# Patient Record
Sex: FEMALE | Race: WHITE | NOT HISPANIC OR LATINO | Employment: OTHER | URBAN - METROPOLITAN AREA
[De-identification: names, ages, dates, MRNs, and addresses within clinical notes are randomized per-mention and may not be internally consistent; named-entity substitution may affect disease eponyms.]

---

## 2017-06-16 ENCOUNTER — HISTORICAL (OUTPATIENT)
Dept: RADIOLOGY | Facility: HOSPITAL | Age: 67
End: 2017-06-16

## 2017-10-23 ENCOUNTER — HISTORICAL (OUTPATIENT)
Dept: ADMINISTRATIVE | Facility: HOSPITAL | Age: 67
End: 2017-10-23

## 2021-09-20 PROBLEM — E78.5 HYPERLIPIDEMIA: Status: ACTIVE | Noted: 2020-09-08

## 2021-09-20 PROBLEM — N18.30 STAGE 3 CHRONIC KIDNEY DISEASE: Status: ACTIVE | Noted: 2020-09-08

## 2021-09-20 PROBLEM — I63.9 CEREBROVASCULAR ACCIDENT: Status: ACTIVE | Noted: 2020-03-10

## 2021-09-20 PROBLEM — R73.03 PREDIABETES: Status: ACTIVE | Noted: 2020-03-10

## 2021-09-20 PROBLEM — I25.10 CORONARY ARTERIOSCLEROSIS: Status: ACTIVE | Noted: 2020-03-10

## 2021-09-20 PROBLEM — Z86.73 HISTORY OF CEREBROVASCULAR ACCIDENT: Status: ACTIVE | Noted: 2020-09-08

## 2022-04-09 ENCOUNTER — HISTORICAL (OUTPATIENT)
Dept: ADMINISTRATIVE | Facility: HOSPITAL | Age: 72
End: 2022-04-09

## 2022-04-26 VITALS
BODY MASS INDEX: 22.43 KG/M2 | HEIGHT: 67 IN | DIASTOLIC BLOOD PRESSURE: 80 MMHG | SYSTOLIC BLOOD PRESSURE: 128 MMHG | WEIGHT: 142.88 LBS

## 2022-06-07 DIAGNOSIS — N18.30 STAGE 3 CHRONIC KIDNEY DISEASE, UNSPECIFIED WHETHER STAGE 3A OR 3B CKD: Primary | ICD-10-CM

## 2022-06-08 ENCOUNTER — LAB VISIT (OUTPATIENT)
Dept: LAB | Facility: HOSPITAL | Age: 72
End: 2022-06-08
Attending: INTERNAL MEDICINE
Payer: MEDICARE

## 2022-06-08 DIAGNOSIS — N18.30 STAGE 3 CHRONIC KIDNEY DISEASE, UNSPECIFIED WHETHER STAGE 3A OR 3B CKD: ICD-10-CM

## 2022-06-08 LAB
ALBUMIN SERPL-MCNC: 4.2 GM/DL (ref 3.4–4.8)
ALBUMIN/GLOB SERPL: 1.6 RATIO (ref 1.1–2)
ALP SERPL-CCNC: 96 UNIT/L (ref 40–150)
ALT SERPL-CCNC: 23 UNIT/L (ref 0–55)
APPEARANCE UR: CLEAR
AST SERPL-CCNC: 16 UNIT/L (ref 5–34)
BACTERIA #/AREA URNS AUTO: NORMAL /HPF
BASOPHILS # BLD AUTO: 0.04 X10(3)/MCL (ref 0–0.2)
BASOPHILS NFR BLD AUTO: 0.8 %
BILIRUB UR QL STRIP.AUTO: NEGATIVE MG/DL
BILIRUBIN DIRECT+TOT PNL SERPL-MCNC: 0.9 MG/DL
BUN SERPL-MCNC: 14.1 MG/DL (ref 9.8–20.1)
CALCIUM SERPL-MCNC: 9.8 MG/DL (ref 8.4–10.2)
CHLORIDE SERPL-SCNC: 108 MMOL/L (ref 98–107)
CO2 SERPL-SCNC: 28 MMOL/L (ref 23–31)
COLOR UR AUTO: YELLOW
CREAT SERPL-MCNC: 0.81 MG/DL (ref 0.55–1.02)
CREAT UR-MCNC: 100 MG/DL (ref 47–110)
EOSINOPHIL # BLD AUTO: 0.07 X10(3)/MCL (ref 0–0.9)
EOSINOPHIL NFR BLD AUTO: 1.5 %
ERYTHROCYTE [DISTWIDTH] IN BLOOD BY AUTOMATED COUNT: 13.4 % (ref 11.5–17)
GLOBULIN SER-MCNC: 2.7 GM/DL (ref 2.4–3.5)
GLUCOSE SERPL-MCNC: 105 MG/DL (ref 82–115)
GLUCOSE UR QL STRIP.AUTO: NEGATIVE MG/DL
HCT VFR BLD AUTO: 39.1 % (ref 37–47)
HGB BLD-MCNC: 13 GM/DL (ref 12–16)
IMM GRANULOCYTES # BLD AUTO: 0.02 X10(3)/MCL (ref 0–0.02)
IMM GRANULOCYTES NFR BLD AUTO: 0.4 % (ref 0–0.43)
KETONES UR QL STRIP.AUTO: NEGATIVE MG/DL
LEUKOCYTE ESTERASE UR QL STRIP.AUTO: ABNORMAL UNIT/L
LYMPHOCYTES # BLD AUTO: 1.42 X10(3)/MCL (ref 0.6–4.6)
LYMPHOCYTES NFR BLD AUTO: 30 %
MCH RBC QN AUTO: 30 PG (ref 27–31)
MCHC RBC AUTO-ENTMCNC: 33.2 MG/DL (ref 33–36)
MCV RBC AUTO: 90.3 FL (ref 80–94)
MONOCYTES # BLD AUTO: 0.35 X10(3)/MCL (ref 0.1–1.3)
MONOCYTES NFR BLD AUTO: 7.4 %
NEUTROPHILS # BLD AUTO: 2.8 X10(3)/MCL (ref 2.1–9.2)
NEUTROPHILS NFR BLD AUTO: 59.9 %
NITRITE UR QL STRIP.AUTO: NEGATIVE
NRBC BLD AUTO-RTO: 0 %
PH UR STRIP.AUTO: 5 [PH]
PHOSPHATE SERPL-MCNC: 4.1 MG/DL (ref 2.3–4.7)
PLATELET # BLD AUTO: 289 X10(3)/MCL (ref 130–400)
PMV BLD AUTO: 10.8 FL (ref 9.4–12.4)
POTASSIUM SERPL-SCNC: 4.2 MMOL/L (ref 3.5–5.1)
PROT SERPL-MCNC: 6.9 GM/DL (ref 5.8–7.6)
PROT UR QL STRIP.AUTO: NEGATIVE MG/DL
PROT UR STRIP-MCNC: <6.8 MG/DL
PTH-INTACT SERPL-MCNC: 66.1 PG/ML (ref 8.7–77)
RBC # BLD AUTO: 4.33 X10(6)/MCL (ref 4.2–5.4)
RBC #/AREA URNS AUTO: <5 /HPF
RBC UR QL AUTO: NEGATIVE UNIT/L
SODIUM SERPL-SCNC: 144 MMOL/L (ref 136–145)
SP GR UR STRIP.AUTO: 1.02 (ref 1–1.03)
SQUAMOUS #/AREA URNS AUTO: <4 /LPF
UROBILINOGEN UR STRIP-ACNC: 0.2 MG/DL
WBC # SPEC AUTO: 4.7 X10(3)/MCL (ref 4.5–11.5)
WBC #/AREA URNS AUTO: <5 /HPF

## 2022-06-08 PROCEDURE — 85025 COMPLETE CBC W/AUTO DIFF WBC: CPT

## 2022-06-08 PROCEDURE — 84100 ASSAY OF PHOSPHORUS: CPT

## 2022-06-08 PROCEDURE — 82042 OTHER SOURCE ALBUMIN QUAN EA: CPT

## 2022-06-08 PROCEDURE — 82570 ASSAY OF URINE CREATININE: CPT

## 2022-06-08 PROCEDURE — 81001 URINALYSIS AUTO W/SCOPE: CPT

## 2022-06-08 PROCEDURE — 36415 COLL VENOUS BLD VENIPUNCTURE: CPT

## 2022-06-08 PROCEDURE — 80053 COMPREHEN METABOLIC PANEL: CPT

## 2022-06-08 PROCEDURE — 83970 ASSAY OF PARATHORMONE: CPT

## 2022-06-09 RX ORDER — FLUTICASONE PROPIONATE 0.5 MG/G
CREAM TOPICAL
COMMUNITY
Start: 2022-05-10 | End: 2022-07-19

## 2022-06-16 ENCOUNTER — OFFICE VISIT (OUTPATIENT)
Dept: NEPHROLOGY | Facility: CLINIC | Age: 72
End: 2022-06-16
Payer: MEDICARE

## 2022-06-16 VITALS
OXYGEN SATURATION: 99 % | WEIGHT: 133.63 LBS | SYSTOLIC BLOOD PRESSURE: 120 MMHG | BODY MASS INDEX: 20.97 KG/M2 | DIASTOLIC BLOOD PRESSURE: 68 MMHG | TEMPERATURE: 98 F | RESPIRATION RATE: 20 BRPM | HEIGHT: 67 IN | HEART RATE: 71 BPM

## 2022-06-16 DIAGNOSIS — N39.0 UTI (URINARY TRACT INFECTION), UNCOMPLICATED: Primary | ICD-10-CM

## 2022-06-16 PROCEDURE — 99203 PR OFFICE/OUTPT VISIT, NEW, LEVL III, 30-44 MIN: ICD-10-PCS | Mod: S$PBB,,, | Performed by: INTERNAL MEDICINE

## 2022-06-16 PROCEDURE — 99999 PR PBB SHADOW E&M-EST. PATIENT-LVL IV: CPT | Mod: PBBFAC,,, | Performed by: INTERNAL MEDICINE

## 2022-06-16 PROCEDURE — 99203 OFFICE O/P NEW LOW 30 MIN: CPT | Mod: S$PBB,,, | Performed by: INTERNAL MEDICINE

## 2022-06-16 PROCEDURE — 99999 PR PBB SHADOW E&M-EST. PATIENT-LVL IV: ICD-10-PCS | Mod: PBBFAC,,, | Performed by: INTERNAL MEDICINE

## 2022-06-16 PROCEDURE — 99214 OFFICE O/P EST MOD 30 MIN: CPT | Mod: PBBFAC | Performed by: INTERNAL MEDICINE

## 2022-06-16 RX ORDER — FLUOROURACIL 50 MG/G
CREAM TOPICAL 2 TIMES DAILY
COMMUNITY
End: 2022-07-19

## 2022-06-16 NOTE — PROGRESS NOTES
[facility] [place] New Referral Office Note    HPI:  Hilda Ordonez 71 y.o. female with a history of  has a past medical history of CVA (cerebrovascular accident), skin cancer to the face, and HLD (hyperlipidemia).. Hilda Ordonez was referred to us by Dr. Diop and presents to office as a new patient for chronic kidney disease 3, however of the few records we can access, her creatinine is normal at 0.8. She denies ever having a renal US. She is complaining of very foul smelling urine and low back/flank pain.     Patient denies taking NSAIDs, new antibiotics or recreational drugs. Denies recent episode of dehydration, diarrhea, vomiting, acute illness, hospitalization, recent angiograms or exposure to IV radiocontrast.         Medical History:   Past Medical History:   Diagnosis Date    ????CKD (chronic kidney disease) stage 3, GFR 30-59 ml/min     History of CVA (cerebrovascular accident)     HLD (hyperlipidemia)        Surgical History:   Past Surgical History:   Procedure Laterality Date    BLADDER SUSPENSION      CATARACT EXTRACTION      PARS PLANA VITRECTOMY W/ REPAIR OF MACULAR HOLE      PARTIAL HYSTERECTOMY      SPINE SURGERY      TYMPANOSTOMY TUBE PLACEMENT         Family History:   Family History   Problem Relation Age of Onset    Vitamin D deficiency Father    .     Social History:   Social History     Tobacco Use    Smoking status: Never Smoker    Smokeless tobacco: Never Used   Substance Use Topics    Alcohol use: Not Currently       Allergies:  Review of patient's allergies indicates:   Allergen Reactions    Gabapentin     Statins-hmg-coa reductase inhibitors      Other reaction(s): Angioedema       Review of Systems:  Constitutional: Denies fever, fatigue, generalized weakness, night sweats, or acute weight change  Skin: Denies wounds, no rashes, no itching, no new skin lesions  HEENT: Denies acute change in hearing or vision, tinnitus, or dysphagia  Respiratory:  Denies cough, shortness  "of breath, or wheezing  Cardiovascular: Denies chest pain, palpitations, or swelling  Gastrointestional: Denies abdominal pain, nausea, vomiting, diarrhea, or constipation  Genitourinary: Denies dysuria, hematuria, or incontinence; reports able to empty bladder; +foul smelling urine  Musculoskeletal: Denies myalgias, decreased ROM or focal weakness; +flank pain at times and intermittent low back pain  Neurological: Denies headaches, seizures, dizziness, paresthesias or weakness  Hematological: Denies unusual bruising or bleeding  Psychiatric: Denies hallucinations, depression, or confusion      Medications:    Current Outpatient Medications:     ALPRAZolam (XANAX) 0.25 MG tablet, Take 0.25 mg by mouth 2 (two) times daily as needed., Disp: , Rfl:     aspirin (ECOTRIN) 81 MG EC tablet, Take 81 mg by mouth once daily., Disp: , Rfl:     ezetimibe (ZETIA) 10 mg tablet, Take 10 mg by mouth once daily., Disp: , Rfl:     fluorouraciL (EFUDEX) 5 % cream, Apply topically 2 (two) times daily., Disp: , Rfl:     multivitamin/iron/folic acid (CENTRUM ORAL), Take 1 tablet by mouth Daily., Disp: , Rfl:     cholestyramine (QUESTRAN) 4 gram packet, MIX CONTENTS OF 1 PACKET WITH WATER OR NON-CARBONATED DRINK AND TAKE BY MOUTH ONCE DAILY, Disp: , Rfl:     fluticasone propionate (CUTIVATE) 0.05 % cream, Apply topically., Disp: , Rfl:        Vitals:  /68 (BP Location: Left arm, Patient Position: Sitting)   Pulse 71   Temp 97.9 °F (36.6 °C) (Oral)   Resp 20   Ht 5' 7" (1.702 m)   Wt 60.6 kg (133 lb 9.6 oz)   SpO2 99%   BMI 20.92 kg/m²  Body mass index is 20.92 kg/m².    Physical Exam:  General: no acute distress, awake, alert  Eyes: PERRLA, EOMI, conjunctiva clear, eyelids without swelling  HENT: atraumatic, oropharynx and nasal mucosa patent  Neck: full ROM, no JVD, no thyromegaly or lymphadenopathy  Respiratory: equal, unlabored, clear to auscultation A/P  Cardiovascular: RRR without murmur or rub; BL radial and " pedal pulses felt  Edema: none  Gastrointestinal: soft, non-tender, non-distended; positive bowel sounds; no masses to palpation  Genitourinary: no CVA tenderness upon palpation  Musculoskeletal: full ROM without limitation or discomfort  Integumentary: warm, dry; no rashes, wounds, or skin lesions  Neurological: oriented, appropriate, no acute deficits      Labs:  Cr 0.8  Electrolytes WNL  Urine : no protein, or active sediment    Impression:    Patient Active Problem List   Diagnosis    Cerebrovascular accident    Coronary arteriosclerosis    History of cerebrovascular accident    Hyperlipidemia    Prediabetes     hx of Pulmonary embolism         Chronic UTIs  No evidence of renal disease  Intermittent flank pain      Plan:  Will order retroperitoneal ultrasound just to make sure we are not missing anything  Patient needs to be seen by a urologist for  work up;  Patient can send us labs any time she has them completed to monitor renal function  She will call us if she needs to be seen again.      Mehnaz Brewster

## 2022-07-19 ENCOUNTER — OFFICE VISIT (OUTPATIENT)
Dept: NEUROLOGY | Facility: CLINIC | Age: 72
End: 2022-07-19
Payer: MEDICARE

## 2022-07-19 VITALS
DIASTOLIC BLOOD PRESSURE: 70 MMHG | BODY MASS INDEX: 21.76 KG/M2 | HEIGHT: 67 IN | WEIGHT: 138.63 LBS | SYSTOLIC BLOOD PRESSURE: 114 MMHG | HEART RATE: 76 BPM

## 2022-07-19 DIAGNOSIS — I63.89 OTHER CEREBRAL INFARCTION: ICD-10-CM

## 2022-07-19 DIAGNOSIS — H02.409 PTOSIS OF EYELID, UNSPECIFIED LATERALITY: Primary | ICD-10-CM

## 2022-07-19 DIAGNOSIS — H53.2 DIPLOPIA: ICD-10-CM

## 2022-07-19 DIAGNOSIS — I63.9 CEREBROVASCULAR ACCIDENT (CVA), UNSPECIFIED MECHANISM: ICD-10-CM

## 2022-07-19 PROCEDURE — 99205 OFFICE O/P NEW HI 60 MIN: CPT | Mod: S$PBB,,, | Performed by: PSYCHIATRY & NEUROLOGY

## 2022-07-19 PROCEDURE — 99205 PR OFFICE/OUTPT VISIT, NEW, LEVL V, 60-74 MIN: ICD-10-PCS | Mod: S$PBB,,, | Performed by: PSYCHIATRY & NEUROLOGY

## 2022-07-19 PROCEDURE — 99999 PR PBB SHADOW E&M-EST. PATIENT-LVL IV: CPT | Mod: PBBFAC,,, | Performed by: PSYCHIATRY & NEUROLOGY

## 2022-07-19 PROCEDURE — 99214 OFFICE O/P EST MOD 30 MIN: CPT | Mod: PBBFAC | Performed by: PSYCHIATRY & NEUROLOGY

## 2022-07-19 PROCEDURE — 99999 PR PBB SHADOW E&M-EST. PATIENT-LVL IV: ICD-10-PCS | Mod: PBBFAC,,, | Performed by: PSYCHIATRY & NEUROLOGY

## 2022-07-19 NOTE — PROGRESS NOTES
Chief Complaint   Patient presents with    Myasthenia gravis     NP: Referred by Dr. Meng for Neuro consult to evaluate for Myasthenia gravis: When she looks from a distance eyes cross which makes her eyes blink and she gets dizzy. Vision also get blurry. Denies any tearing to eyes.        This is a 71 y.o. female  here for Neuro consult to evaluate for Myasthenia gravis: When she looks from a distance eyes cross which makes her eyes blink and she gets dizzy. Vision also get blurry. Denies any tearing to eyes. Does have macular hole on left and floaters in central vision on right eye. Denies dysarthria, dysphagia, dyspnea, extremity weakness, fatigable weakness.     Saw her back In 2016 and she has complaints of right eye ptosis and Achr AB was negative at the time. Levator dehiscence discussed at that time, did undergo surgery and has not had recurrence,denies ptosis in left.     Hx of stroke in 2015 on ASA.    Medication List with Changes/Refills   Current Medications    ALPRAZOLAM (XANAX) 0.25 MG TABLET    Take 0.25 mg by mouth 2 (two) times daily as needed.    ASPIRIN (ECOTRIN) 81 MG EC TABLET    Take 81 mg by mouth once daily.    EZETIMIBE (ZETIA) 10 MG TABLET    Take 10 mg by mouth once daily.    MULTIVIT-MINS NO.63/IRON/FOLIC (M-VIT ORAL)    Take by mouth.   Discontinued Medications    CHOLESTYRAMINE (QUESTRAN) 4 GRAM PACKET    MIX CONTENTS OF 1 PACKET WITH WATER OR NON-CARBONATED DRINK AND TAKE BY MOUTH ONCE DAILY    FLUOROURACIL (EFUDEX) 5 % CREAM    Apply topically 2 (two) times daily.    FLUTICASONE PROPIONATE (CUTIVATE) 0.05 % CREAM    Apply topically.    MULTIVITAMIN/IRON/FOLIC ACID (CENTRUM ORAL)    Take 1 tablet by mouth Daily.        Past Surgical History:   Procedure Laterality Date    BLADDER SUSPENSION      CATARACT EXTRACTION      PARS PLANA VITRECTOMY W/ REPAIR OF MACULAR HOLE      PARTIAL HYSTERECTOMY      SPINE SURGERY      TYMPANOSTOMY TUBE PLACEMENT          Past Medical History:    Diagnosis Date    History of CVA (cerebrovascular accident)     HLD (hyperlipidemia)         Family History   Problem Relation Age of Onset    Vitamin D deficiency Father     Cancer Father         Social History     Socioeconomic History    Marital status:    Tobacco Use    Smoking status: Never Smoker    Smokeless tobacco: Never Used   Substance and Sexual Activity    Alcohol use: Never    Drug use: Never          Review of Systems  Review of Systems   Constitutional: Negative for appetite change.   HENT: Negative for sinus pressure and sore throat.    Eyes: Negative for visual disturbance.   Respiratory: Negative for cough and shortness of breath.    Cardiovascular: Negative for chest pain.   Gastrointestinal: Negative for diarrhea and nausea.   Endocrine: Negative for cold intolerance and heat intolerance.   Genitourinary: Negative for dysuria.   Musculoskeletal: Negative for arthralgias and myalgias.   Skin: Negative for rash.   Allergic/Immunologic: Negative for immunocompromised state.   Neurological:        See HPI   Hematological: Does not bruise/bleed easily.   Psychiatric/Behavioral: Negative for hallucinations.      General: alert and oriented, no acute distress, no audible wheezes, pulse intact, no edema    Vitals:    07/19/22 0828   BP: 114/70   Pulse: 76        General: alert and oriented, No acute distress, no audible wheezes pulse intact. No edema  RRR no MRG      Cognition and Comprehension  Speech and language intact.  follow commands  speech fluent  attention intact  memory for recent events intact from history taking  affect pleasant  fund of knowledge adequate      Cranial Nerves  II. Optic: Visual fields (Full to confrontation both eyes).  III, IV, VI. Oculomotor: Intact, Pupils equal, round and reactive to light, no nystagmus, funduscopy normal, no sig ptosis, no OO weakness, no facial weaknessV. Trigeminal: Sensation of light touch (Normal)  VII. Facial: flatttened NLF on  right  VIII, hearing intact to spoken voice  IX/X. Glossopharyngeal/ Vagus: Voice (normal).  XI. shoulder shrug normal  XII. Hypoglossal: Intact.    Muscle Strength & Tone  Normal upper extremity tone,  Normal lower extremity tone.  Normal upper extremity strength  normal lower extremity strength    Sensation  Intact to light touch and temperature.    Reflexes  normal and symmetric    Coordination and Gait  Normal, finger to nose normal      Hilda was seen today for myasthenia gravis.    Diagnoses and all orders for this visit:    Ptosis of eyelid, unspecified laterality  -     Myasthenia Gravis Eval With Musk Reflex; Future    Cerebrovascular accident (CVA), unspecified mechanism    Diplopia  -     Myasthenia Gravis Eval With Musk Reflex; Future  -     MRI Orbits W W/O Contrast; Future    Other cerebral infarction  -     MRI Brain Without Contrast; Future       History not c/w myasthenia but will do screening test. No other signs or sx of myasthenia    Cont ASA for hx of stroke

## 2022-07-20 ENCOUNTER — TELEPHONE (OUTPATIENT)
Dept: NEUROLOGY | Facility: CLINIC | Age: 72
End: 2022-07-20
Payer: MEDICARE

## 2022-07-20 NOTE — TELEPHONE ENCOUNTER
Pt states returning a call from someone in office. Asked if a VM was left of who might have called. Pt states she will check and rtn call after.     LOV: 07/19/22    NOV: none

## 2022-07-22 ENCOUNTER — HOSPITAL ENCOUNTER (OUTPATIENT)
Dept: RADIOLOGY | Facility: HOSPITAL | Age: 72
Discharge: HOME | End: 2022-07-22
Attending: PSYCHIATRY & NEUROLOGY
Payer: MEDICARE

## 2022-07-22 DIAGNOSIS — H53.2 DIPLOPIA: ICD-10-CM

## 2022-07-22 DIAGNOSIS — I63.89 OTHER CEREBRAL INFARCTION: ICD-10-CM

## 2022-07-22 PROCEDURE — A9577 INJ MULTIHANCE: HCPCS | Performed by: PSYCHIATRY & NEUROLOGY

## 2022-07-22 PROCEDURE — 25500020 PHARM REV CODE 255: Performed by: PSYCHIATRY & NEUROLOGY

## 2022-07-22 PROCEDURE — 70543 MRI ORBT/FAC/NCK W/O &W/DYE: CPT | Mod: TC

## 2022-07-22 PROCEDURE — 70551 MRI BRAIN STEM W/O DYE: CPT | Mod: TC

## 2022-07-22 RX ADMIN — GADOBENATE DIMEGLUMINE 15 ML: 529 INJECTION, SOLUTION INTRAVENOUS at 09:07

## 2022-07-27 ENCOUNTER — TELEPHONE (OUTPATIENT)
Dept: NEUROLOGY | Facility: CLINIC | Age: 72
End: 2022-07-27
Payer: MEDICARE

## 2022-07-27 NOTE — TELEPHONE ENCOUNTER
----- Message from Brittney Lanza sent at 2022  9:18 AM CDT -----  Regarding: returning call  CallType: Patient Call  To: [facility] in AM   From: Hilda Ordonez   Phone: [phone]   Patient name: Same   : 1950   Reg Dr: Dr Jie Griffith   Ref: missed call from office    Clr ID: [id number]    --------------------------------------  Message History  Account: [id number]  Taken:  2022  4:00p [facility]  Serial#: [id number]

## 2022-07-27 NOTE — TELEPHONE ENCOUNTER
Patient called requesting Dr. Griffith's office to send her Myasthenia Gravis test results to Dr. Meng's office. States the Dr. Meng need the neuro evaluation to be able to move forward with eye surgery per patient. Spoke with Dr. Meng's office ([place]) and was requested if Dr. Griffith can write on the evaluation that the patient is clear for her neuro evaluation. Fax number is [phone].

## 2023-12-23 ENCOUNTER — HOSPITAL ENCOUNTER (INPATIENT)
Facility: HOSPITAL | Age: 73
LOS: 2 days | Discharge: HOME | DRG: 063 | End: 2023-12-25
Attending: STUDENT IN AN ORGANIZED HEALTH CARE EDUCATION/TRAINING PROGRAM | Admitting: INTERNAL MEDICINE
Payer: MEDICARE

## 2023-12-23 DIAGNOSIS — I63.9 CEREBROVASCULAR ACCIDENT (CVA), UNSPECIFIED MECHANISM: ICD-10-CM

## 2023-12-23 DIAGNOSIS — I63.9 CVA (CEREBRAL VASCULAR ACCIDENT): ICD-10-CM

## 2023-12-23 DIAGNOSIS — R29.818 ACUTE FOCAL NEUROLOGICAL DEFICIT: Primary | ICD-10-CM

## 2023-12-23 DIAGNOSIS — I63.9 STROKE: ICD-10-CM

## 2023-12-23 DIAGNOSIS — R27.0 ATAXIA OF LEFT UPPER EXTREMITY: ICD-10-CM

## 2023-12-23 LAB
ALBUMIN SERPL-MCNC: 4.3 G/DL (ref 3.4–4.8)
ALBUMIN/GLOB SERPL: 1.2 RATIO (ref 1.1–2)
ALP SERPL-CCNC: 114 UNIT/L (ref 40–150)
ALT SERPL-CCNC: 22 UNIT/L (ref 0–55)
ANION GAP SERPL CALC-SCNC: 18 MMOL/L (ref 8–16)
APTT PPP: 32.5 SECONDS (ref 23.2–33.7)
AST SERPL-CCNC: 18 UNIT/L (ref 5–34)
BASOPHILS # BLD AUTO: 0.06 X10(3)/MCL
BASOPHILS NFR BLD AUTO: 1 %
BILIRUB SERPL-MCNC: 0.7 MG/DL
BNP BLD-MCNC: 58.1 PG/ML
BUN SERPL-MCNC: 15.1 MG/DL (ref 9.8–20.1)
BUN SERPL-MCNC: 16 MG/DL (ref 6–30)
CALCIUM SERPL-MCNC: 9.5 MG/DL (ref 8.4–10.2)
CHLORIDE SERPL-SCNC: 103 MMOL/L (ref 95–110)
CHLORIDE SERPL-SCNC: 107 MMOL/L (ref 98–107)
CHOLEST SERPL-MCNC: 190 MG/DL
CHOLEST/HDLC SERPL: 3 {RATIO} (ref 0–5)
CO2 SERPL-SCNC: 26 MMOL/L (ref 23–31)
CREAT SERPL-MCNC: 0.97 MG/DL (ref 0.55–1.02)
CREAT SERPL-MCNC: 1 MG/DL (ref 0.5–1.4)
EOSINOPHIL # BLD AUTO: 0.21 X10(3)/MCL (ref 0–0.9)
EOSINOPHIL NFR BLD AUTO: 3.3 %
ERYTHROCYTE [DISTWIDTH] IN BLOOD BY AUTOMATED COUNT: 13 % (ref 11.5–17)
GFR SERPLBLD CREATININE-BSD FMLA CKD-EPI: >60 MLS/MIN/1.73/M2
GLOBULIN SER-MCNC: 3.5 GM/DL (ref 2.4–3.5)
GLUCOSE SERPL-MCNC: 135 MG/DL (ref 82–115)
GLUCOSE SERPL-MCNC: 138 MG/DL (ref 70–110)
HCT VFR BLD AUTO: 39.5 % (ref 37–47)
HCT VFR BLD CALC: 39 %PCV (ref 36–54)
HDLC SERPL-MCNC: 59 MG/DL (ref 35–60)
HGB BLD-MCNC: 13 G/DL
HGB BLD-MCNC: 13.1 G/DL (ref 12–16)
IMM GRANULOCYTES # BLD AUTO: 0.01 X10(3)/MCL (ref 0–0.04)
IMM GRANULOCYTES NFR BLD AUTO: 0.2 %
INR PPP: 1
LDLC SERPL CALC-MCNC: 106 MG/DL (ref 50–140)
LYMPHOCYTES # BLD AUTO: 2.65 X10(3)/MCL (ref 0.6–4.6)
LYMPHOCYTES NFR BLD AUTO: 42.1 %
MCH RBC QN AUTO: 29.7 PG (ref 27–31)
MCHC RBC AUTO-ENTMCNC: 33.2 G/DL (ref 33–36)
MCV RBC AUTO: 89.6 FL (ref 80–94)
MONOCYTES # BLD AUTO: 0.52 X10(3)/MCL (ref 0.1–1.3)
MONOCYTES NFR BLD AUTO: 8.3 %
NEUTROPHILS # BLD AUTO: 2.84 X10(3)/MCL (ref 2.1–9.2)
NEUTROPHILS NFR BLD AUTO: 45.1 %
NRBC BLD AUTO-RTO: 0 %
PLATELET # BLD AUTO: 291 X10(3)/MCL (ref 130–400)
PMV BLD AUTO: 10.6 FL (ref 7.4–10.4)
POC IONIZED CALCIUM: 1.2 MMOL/L (ref 1.06–1.42)
POC PTINR: 1.1 (ref 0.9–1.2)
POC PTWBT: 13.5 SEC (ref 9.7–14.3)
POC TCO2 (MEASURED): 27 MMOL/L (ref 23–27)
POCT GLUCOSE: 124 MG/DL (ref 70–110)
POTASSIUM BLD-SCNC: 3.8 MMOL/L (ref 3.5–5.1)
POTASSIUM SERPL-SCNC: 3.9 MMOL/L (ref 3.5–5.1)
PROT SERPL-MCNC: 7.8 GM/DL (ref 5.8–7.6)
PROTHROMBIN TIME: 13.2 SECONDS (ref 12.5–14.5)
RBC # BLD AUTO: 4.41 X10(6)/MCL (ref 4.2–5.4)
SAMPLE: ABNORMAL
SAMPLE: NORMAL
SODIUM BLD-SCNC: 142 MMOL/L (ref 136–145)
SODIUM SERPL-SCNC: 143 MMOL/L (ref 136–145)
TRIGL SERPL-MCNC: 124 MG/DL (ref 37–140)
TROPONIN I SERPL-MCNC: <0.01 NG/ML (ref 0–0.04)
TSH SERPL-ACNC: 1.48 UIU/ML (ref 0.35–4.94)
VLDLC SERPL CALC-MCNC: 25 MG/DL
WBC # SPEC AUTO: 6.29 X10(3)/MCL (ref 4.5–11.5)

## 2023-12-23 PROCEDURE — 85025 COMPLETE CBC W/AUTO DIFF WBC: CPT | Performed by: STUDENT IN AN ORGANIZED HEALTH CARE EDUCATION/TRAINING PROGRAM

## 2023-12-23 PROCEDURE — 99291 CRITICAL CARE FIRST HOUR: CPT

## 2023-12-23 PROCEDURE — 80048 BASIC METABOLIC PNL TOTAL CA: CPT | Mod: XB

## 2023-12-23 PROCEDURE — 84484 ASSAY OF TROPONIN QUANT: CPT | Performed by: STUDENT IN AN ORGANIZED HEALTH CARE EDUCATION/TRAINING PROGRAM

## 2023-12-23 PROCEDURE — 63600175 PHARM REV CODE 636 W HCPCS: Performed by: STUDENT IN AN ORGANIZED HEALTH CARE EDUCATION/TRAINING PROGRAM

## 2023-12-23 PROCEDURE — 63600175 PHARM REV CODE 636 W HCPCS: Mod: JG

## 2023-12-23 PROCEDURE — 83880 ASSAY OF NATRIURETIC PEPTIDE: CPT | Performed by: STUDENT IN AN ORGANIZED HEALTH CARE EDUCATION/TRAINING PROGRAM

## 2023-12-23 PROCEDURE — 80061 LIPID PANEL: CPT | Performed by: STUDENT IN AN ORGANIZED HEALTH CARE EDUCATION/TRAINING PROGRAM

## 2023-12-23 PROCEDURE — 20000000 HC ICU ROOM

## 2023-12-23 PROCEDURE — 84443 ASSAY THYROID STIM HORMONE: CPT | Performed by: STUDENT IN AN ORGANIZED HEALTH CARE EDUCATION/TRAINING PROGRAM

## 2023-12-23 PROCEDURE — 82962 GLUCOSE BLOOD TEST: CPT

## 2023-12-23 PROCEDURE — 85610 PROTHROMBIN TIME: CPT | Performed by: STUDENT IN AN ORGANIZED HEALTH CARE EDUCATION/TRAINING PROGRAM

## 2023-12-23 PROCEDURE — 85730 THROMBOPLASTIN TIME PARTIAL: CPT | Performed by: STUDENT IN AN ORGANIZED HEALTH CARE EDUCATION/TRAINING PROGRAM

## 2023-12-23 PROCEDURE — 80053 COMPREHEN METABOLIC PANEL: CPT | Performed by: STUDENT IN AN ORGANIZED HEALTH CARE EDUCATION/TRAINING PROGRAM

## 2023-12-23 PROCEDURE — 96374 THER/PROPH/DIAG INJ IV PUSH: CPT

## 2023-12-23 PROCEDURE — 96375 TX/PRO/DX INJ NEW DRUG ADDON: CPT

## 2023-12-23 RX ORDER — LORAZEPAM 2 MG/ML
0.5 INJECTION INTRAMUSCULAR
Status: COMPLETED | OUTPATIENT
Start: 2023-12-23 | End: 2023-12-23

## 2023-12-23 RX ADMIN — LORAZEPAM 0.5 MG: 2 INJECTION INTRAMUSCULAR; INTRAVENOUS at 10:12

## 2023-12-23 RX ADMIN — Medication 16.5 MG: at 09:12

## 2023-12-23 NOTE — Clinical Note
Diagnosis: CVA (cerebral vascular accident) [166739]   Future Attending Provider: JAIME SANABRIA [[id number]]   Admitting Provider:: JAIME SANABRIA [[id number]]   Admit to which facility:: [facility] [[id number]]   Reason for IP Medical Treatment  (Clinical interventions that can only be accomplished in the IP setting? ) :: CVA   I certify that Inpatient services for greater than or equal to 2 midnights are medically necessary:: Yes   Plans for Post-Acute care--if anticipated (pick the single best option):: A. No post acute care anticipated at this time

## 2023-12-24 PROBLEM — R27.0 ATAXIA OF LEFT UPPER EXTREMITY: Status: ACTIVE | Noted: 2023-12-24

## 2023-12-24 LAB
AV INDEX (PROSTH): 0.95
AV MEAN GRADIENT: 2 MMHG
AV PEAK GRADIENT: 3 MMHG
AV VALVE AREA BY VELOCITY RATIO: 2.37 CM²
AV VALVE AREA: 2.4 CM²
AV VELOCITY RATIO: 0.93
BSA FOR ECHO PROCEDURE: 1.75 M2
CV ECHO LV RWT: 0.51 CM
DOP CALC AO PEAK VEL: 0.88 M/S
DOP CALC AO VTI: 20.2 CM
DOP CALC LVOT AREA: 2.5 CM2
DOP CALC LVOT DIAMETER: 1.8 CM
DOP CALC LVOT PEAK VEL: 0.82 M/S
DOP CALC LVOT STROKE VOLUME: 48.58 CM3
DOP CALC MV VTI: 39.7 CM
DOP CALCLVOT PEAK VEL VTI: 19.1 CM
E WAVE DECELERATION TIME: 238 MSEC
E/A RATIO: 1.1
E/E' RATIO: 7.6 M/S
ECHO LV POSTERIOR WALL: 0.93 CM (ref 0.6–1.1)
EST. AVERAGE GLUCOSE BLD GHB EST-MCNC: 122.6 MG/DL
FRACTIONAL SHORTENING: 30 % (ref 28–44)
HBA1C MFR BLD: 5.9 %
INTERVENTRICULAR SEPTUM: 1.15 CM (ref 0.6–1.1)
LEFT ATRIUM SIZE: 3.2 CM
LEFT INTERNAL DIMENSION IN SYSTOLE: 2.56 CM (ref 2.1–4)
LEFT VENTRICLE DIASTOLIC VOLUME INDEX: 32.8 ML/M2
LEFT VENTRICLE DIASTOLIC VOLUME: 57.4 ML
LEFT VENTRICLE MASS INDEX: 68 G/M2
LEFT VENTRICLE SYSTOLIC VOLUME INDEX: 13.5 ML/M2
LEFT VENTRICLE SYSTOLIC VOLUME: 23.7 ML
LEFT VENTRICULAR INTERNAL DIMENSION IN DIASTOLE: 3.68 CM (ref 3.5–6)
LEFT VENTRICULAR MASS: 118.13 G
LV LATERAL E/E' RATIO: 6.33 M/S
LV SEPTAL E/E' RATIO: 9.5 M/S
LVOT MG: 1 MMHG
LVOT MV: 0.55 CM/S
MV MEAN GRADIENT: 2 MMHG
MV PEAK A VEL: 0.69 M/S
MV PEAK E VEL: 0.76 M/S
MV PEAK GRADIENT: 5 MMHG
MV STENOSIS PRESSURE HALF TIME: 82 MS
MV VALVE AREA BY CONTINUITY EQUATION: 1.22 CM2
MV VALVE AREA P 1/2 METHOD: 2.68 CM2
OHS LV EJECTION FRACTION SIMPSONS BIPLANE MOD: 66 %
PISA TR MAX VEL: 2.4 M/S
PV PEAK GRADIENT: 3 MMHG
PV PEAK VELOCITY: 0.81 M/S
RA PRESSURE ESTIMATED: 3 MMHG
RIGHT VENTRICULAR END-DIASTOLIC DIMENSION: 2.87 CM
RV TB RVSP: 5 MMHG
TDI LATERAL: 0.12 M/S
TDI SEPTAL: 0.08 M/S
TDI: 0.1 M/S
TR MAX PG: 23 MMHG
TRICUSPID ANNULAR PLANE SYSTOLIC EXCURSION: 2.22 CM
TV REST PULMONARY ARTERY PRESSURE: 26 MMHG
Z-SCORE OF LEFT VENTRICULAR DIMENSION IN END DIASTOLE: -2.74
Z-SCORE OF LEFT VENTRICULAR DIMENSION IN END SYSTOLE: -1.24

## 2023-12-24 PROCEDURE — 25000003 PHARM REV CODE 250: Performed by: NURSE PRACTITIONER

## 2023-12-24 PROCEDURE — 92523 SPEECH SOUND LANG COMPREHEN: CPT

## 2023-12-24 PROCEDURE — 83036 HEMOGLOBIN GLYCOSYLATED A1C: CPT | Performed by: NURSE PRACTITIONER

## 2023-12-24 PROCEDURE — 99223 PR INITIAL HOSPITAL CARE,LEVL III: ICD-10-PCS | Mod: FS,,, | Performed by: PSYCHIATRY & NEUROLOGY

## 2023-12-24 PROCEDURE — 20000000 HC ICU ROOM

## 2023-12-24 PROCEDURE — 99223 1ST HOSP IP/OBS HIGH 75: CPT | Mod: FS,,, | Performed by: PSYCHIATRY & NEUROLOGY

## 2023-12-24 RX ORDER — SODIUM CHLORIDE 9 MG/ML
INJECTION, SOLUTION INTRAVENOUS CONTINUOUS
Status: DISCONTINUED | OUTPATIENT
Start: 2023-12-24 | End: 2023-12-25 | Stop reason: HOSPADM

## 2023-12-24 RX ORDER — ONDANSETRON 2 MG/ML
4 INJECTION INTRAMUSCULAR; INTRAVENOUS EVERY 8 HOURS PRN
Status: DISCONTINUED | OUTPATIENT
Start: 2023-12-24 | End: 2023-12-25 | Stop reason: HOSPADM

## 2023-12-24 RX ADMIN — SODIUM CHLORIDE: 9 INJECTION, SOLUTION INTRAVENOUS at 09:12

## 2023-12-24 NOTE — NURSING
Nurses Note -- 4 Eyes      12/24/2023   7:40 AM      Skin assessed during: Daily Assessment      [x] No Altered Skin Integrity Present    [x]Prevention Measures Documented      [] Yes- Altered Skin Integrity Present or Discovered   [] LDA Added if Not in Epic (Describe Wound)   [] New Altered Skin Integrity was Present on Admit and Documented in LDA   [] Wound Image Taken    Wound Care Consulted? No    Attending Nurse:  Gina Peck RN/Staff Member:   MARIA ALEJANDRA Alonzo

## 2023-12-24 NOTE — CONSULTS
Inpatient consult to Cardiology  Consult performed by: Adry Kelsey FNP  Consult ordered by: JAIME Donahue MD      [facility]    Cardiology  Consult Note    Patient Name: Hilda Ordonez  MRN: [id number]  Admission Date: 12/23/2023  Hospital Length of Stay: 1 days  Code Status: No Order   Attending Provider: JAIME Donahue MD   Consulting Provider: ROSARIO Rangel  Primary Care Physician: Valentin Diop MD  Principal Problem:<principal problem not specified>    Patient information was obtained from patient and ER records.     Subjective:     Chief Complaint:  CVA     HPI: This is a 73 year old female, previously known to [facility], Dr. Bragg, with hx HLD, carotid artery disease,  hx CVA (2015), and PE (previously on Eliquis).  Last seen in clinic in Nov. 2020. Patient presented to [facility] ED on 12.23.23 with complaints of left upper extremity ataxia, facial droop, left-sided blurred vision. CT of head  and MRI -negative.  CTA head and neck unremarkable.  Blood pressure on admission 179/91.  Labs are remarkable.  Patient received TNK and admitted to ICU. [facility] is consulted for CVA      PMH: HLD, carotid artery disease,  hx CVA, and PE.  PSH:  Breast implants; hysterectomy.  Family History: Father- leukemia  Social History:     Previous Cardiac Diagnostics:   ECHO 11.18.20  1. The study quality is average.   2. The left ventricle is normal in size. Global left ventricular systolic function is normal. The left ventricular ejection fraction is 60%. The left ventricle diastolic function is impaired (Grade I) with normal left atrial pressure.   3. Diffuse thickening of the aortic valve cusps is noted with normal cuspal excursion  4.  Mild (1+) aortic regurgitation. Mild (1+) mitral regurgitation. Mild (1+) tricuspid regurgitation. Trace pulmonic regurgitation.   5. The pulmonary artery systolic pressure is 25 mmHg.     Carotid US 11.18.20  1. The study quality is good.   2. 1-39%  stenosis in the proximal right internal carotid artery based on Bluth Criteria.   3. 40-59% stenosis in the distal left internal carotid artery based on Bluth Criteria. Distal portion is tortuous and appears ~40% stenosis by 2D.   4. Antegrade right vertebral artery flow.   5. Antegrade left vertebral artery flow.      PET 11.11.20   This is a normal perfusion study, no perfusion defects noted. There is no evidence of ischemia.    This scan is suggestive of low risk for future cardiovascular events.    The left ventricular cavity is noted to be normal on the stress studies. The stress left ventricular ejection fraction was calculated to be 72% and left ventricular global function is normal. The rest left ventricular cavity is noted to be normal. The rest left ventricular ejection fraction was calculated to be 58% and rest left ventricular global function is normal.    When compared to the resting ejection fraction (58%), the stress ejection fraction (72%) has increased.    The study quality is excellent.           Past Medical History:   Diagnosis Date    History of CVA (cerebrovascular accident)     HLD (hyperlipidemia)        Past Surgical History:   Procedure Laterality Date    BLADDER SUSPENSION      CATARACT EXTRACTION      PARS PLANA VITRECTOMY W/ REPAIR OF MACULAR HOLE      PARTIAL HYSTERECTOMY      SPINE SURGERY      TYMPANOSTOMY TUBE PLACEMENT         Review of patient's allergies indicates:   Allergen Reactions    Gabapentin     Statins-hmg-coa reductase inhibitors      Other reaction(s): Angioedema       No current facility-administered medications on file prior to encounter.     Current Outpatient Medications on File Prior to Encounter   Medication Sig    ALPRAZolam (XANAX) 0.25 MG tablet Take 0.25 mg by mouth 2 (two) times daily as needed.    aspirin (ECOTRIN) 81 MG EC tablet Take 81 mg by mouth once daily.    ezetimibe (ZETIA) 10 mg tablet Take 10 mg by mouth once daily.    multivit-mins  no.63/iron/folic (M-VIT ORAL) Take by mouth.     Family History       Problem Relation (Age of Onset)    Cancer Father    Vitamin D deficiency Father          Tobacco Use    Smoking status: Never    Smokeless tobacco: Never   Substance and Sexual Activity    Alcohol use: Never    Drug use: Never    Sexual activity: Not on file       Review of Systems   Respiratory:  Negative for chest tightness and shortness of breath.    Neurological:  Positive for facial asymmetry.        Vision changes and LUE ataxia        Objective:     Vital Signs (Most Recent):  Temp: 97.7 °F (36.5 °C) (12/24/23 0800)  Pulse: 62 (12/24/23 0800)  Resp: 16 (12/24/23 0800)  BP: 124/78 (12/24/23 0800)  SpO2: 97 % (12/24/23 0800) Vital Signs (24h Range):  Temp:  [97.7 °F (36.5 °C)-98.7 °F (37.1 °C)] 97.7 °F (36.5 °C)  Pulse:  [57-77] 62  Resp:  [13-23] 16  SpO2:  [91 %-99 %] 97 %  BP: (110-179)/() 124/78     Weight: 65.9 kg (145 lb 4.5 oz)  Body mass index is 23.45 kg/m².    SpO2: 97 %         Intake/Output Summary (Last 24 hours) at 12/24/2023 0822  Last data filed at 12/23/2023 2242  Gross per 24 hour   Intake --   Output 1000 ml   Net -1000 ml       Lines/Drains/Airways       Drain  Duration             Female External Urinary Catheter w/ Suction -- days              Peripheral Intravenous Line  Duration                  Peripheral IV - Single Lumen 12/23/23 2020 20 G Anterior;Distal;Right Forearm <1 day         Peripheral IV - Single Lumen 12/23/23 2114 20 G Anterior;Left Forearm <1 day                    Significant Labs:  Recent Results (from the past 72 hour(s))   POCT glucose    Collection Time: 12/23/23  8:22 PM   Result Value Ref Range    POCT Glucose 124 (H) 70 - 110 mg/dL   ISTAT PROCEDURE    Collection Time: 12/23/23  8:22 PM   Result Value Ref Range    POC PTWBT 13.5 9.7 - 14.3 sec    POC PTINR 1.1 0.9 - 1.2    Sample VENOUS    ISTAT CHEM8    Collection Time: 12/23/23  8:23 PM   Result Value Ref Range    POC Glucose 138 (H) 70  - 110 mg/dL    POC BUN 16 6 - 30 mg/dL    POC Creatinine 1.0 0.5 - 1.4 mg/dL    POC Sodium 142 136 - 145 mmol/L    POC Potassium 3.8 3.5 - 5.1 mmol/L    POC Chloride 103 95 - 110 mmol/L    POC TCO2 (MEASURED) 27 23 - 27 mmol/L    POC Anion Gap 18 (H) 8 - 16 mmol/L    POC Ionized Calcium 1.20 1.06 - 1.42 mmol/L    POC Hematocrit 39 36 - 54 %PCV    POC HEMOGLOBIN 13 g/dL    Sample ARTERIAL    Comprehensive metabolic panel    Collection Time: 12/23/23  8:38 PM   Result Value Ref Range    Sodium Level 143 136 - 145 mmol/L    Potassium Level 3.9 3.5 - 5.1 mmol/L    Chloride 107 98 - 107 mmol/L    Carbon Dioxide 26 23 - 31 mmol/L    Glucose Level 135 (H) 82 - 115 mg/dL    Blood Urea Nitrogen 15.1 9.8 - 20.1 mg/dL    Creatinine 0.97 0.55 - 1.02 mg/dL    Calcium Level Total 9.5 8.4 - 10.2 mg/dL    Protein Total 7.8 (H) 5.8 - 7.6 gm/dL    Albumin Level 4.3 3.4 - 4.8 g/dL    Globulin 3.5 2.4 - 3.5 gm/dL    Albumin/Globulin Ratio 1.2 1.1 - 2.0 ratio    Bilirubin Total 0.7 <=1.5 mg/dL    Alkaline Phosphatase 114 40 - 150 unit/L    Alanine Aminotransferase 22 0 - 55 unit/L    Aspartate Aminotransferase 18 5 - 34 unit/L    eGFR >60 mls/min/1.73/m2   Protime-INR    Collection Time: 12/23/23  8:38 PM   Result Value Ref Range    PT 13.2 12.5 - 14.5 seconds    INR 1.0 <=1.3   TSH    Collection Time: 12/23/23  8:38 PM   Result Value Ref Range    TSH 1.480 0.350 - 4.940 uIU/mL   LDL - Lipid Panel    Collection Time: 12/23/23  8:38 PM   Result Value Ref Range    Cholesterol Total 190 <=200 mg/dL    HDL Cholesterol 59 35 - 60 mg/dL    Triglyceride 124 37 - 140 mg/dL    Cholesterol/HDL Ratio 3 0 - 5    Very Low Density Lipoprotein 25     LDL Cholesterol 106.00 50.00 - 140.00 mg/dL   APTT    Collection Time: 12/23/23  8:38 PM   Result Value Ref Range    PTT 32.5 23.2 - 33.7 seconds   CBC with Differential    Collection Time: 12/23/23  8:38 PM   Result Value Ref Range    WBC 6.29 4.50 - 11.50 x10(3)/mcL    RBC 4.41 4.20 - 5.40 x10(6)/mcL     Hgb 13.1 12.0 - 16.0 g/dL    Hct 39.5 37.0 - 47.0 %    MCV 89.6 80.0 - 94.0 fL    MCH 29.7 27.0 - 31.0 pg    MCHC 33.2 33.0 - 36.0 g/dL    RDW 13.0 11.5 - 17.0 %    Platelet 291 130 - 400 x10(3)/mcL    MPV 10.6 (H) 7.4 - 10.4 fL    Neut % 45.1 %    Lymph % 42.1 %    Mono % 8.3 %    Eos % 3.3 %    Basophil % 1.0 %    Lymph # 2.65 0.6 - 4.6 x10(3)/mcL    Neut # 2.84 2.1 - 9.2 x10(3)/mcL    Mono # 0.52 0.1 - 1.3 x10(3)/mcL    Eos # 0.21 0 - 0.9 x10(3)/mcL    Baso # 0.06 <=0.2 x10(3)/mcL    IG# 0.01 0 - 0.04 x10(3)/mcL    IG% 0.2 %    NRBC% 0.0 %   Troponin I    Collection Time: 12/23/23  8:38 PM   Result Value Ref Range    Troponin-I <0.010 0.000 - 0.045 ng/mL   Brain natriuretic peptide    Collection Time: 12/23/23  8:38 PM   Result Value Ref Range    Natriuretic Peptide 58.1 <=100.0 pg/mL       Significant Imaging:  Imaging Results              MRI Brain Without Contrast (Preliminary result)  Result time 12/23/23 23:52:05      Preliminary result by Mike Nobles MD (12/23/23 23:52:05)                   Narrative:    START OF REPORT:  Technique: Multiplanar, multisequence magnetic resonance imaging of the brain was performed without intravenous contrast.    Comparison: Correlation is with CT study dated2023-12-23.    Clinical history: Stroke follow up.    Findings:  Hemorrhage: No acute intracranial hemorrhage is identified.  Stroke: No abnormal signal is identified on the diffusion images to suggest acute infarct.  Extra axial spaces: The ventricles and sulci and basal cisterns all appear mildly prominent consistent with global cerebral atrophy.  Cerebral, cerebellar, and brainstem parenchyma: Mild periventricular and subcortical white matter signal abnormalities are seen. The main consideration is small vessel ischemic changes in a patient of this age.  Cranial nerves: Normal.  Herniation: None.  Calvarium: Within normal limits.  Vascular system: Normal flow voids.  Visualized paranasal sinuses:  Normal.  Visualized orbits: The visualized orbits appear unremarkable.  Sella and skull base: Normal.  Temporal bones and mastoids: Within normal limits.      Impression:  1. No abnormal signal is identified on the diffusion images to suggest acute infarct.  2. No acute intracranial process is identified. Details and other findings as discussed above.                                         CTA Head and Neck (xpd) (Preliminary result)  Result time 12/23/23 21:10:18      Preliminary result by Mike Nobles MD (12/23/23 21:10:18)                   Narrative:    START OF REPORT:  Technique: CT angiogram of the intracranial vessels was performed without and with intravenous contrast with direct axial as well as sagittal and coronal reformations. CT angiogram of the neck vessels was performed without and with intravenous contrast with direct axial as well as sagittal and coronal reformations.    Comparison: Comparison is with noncontrast CT head dated2023-12-23 20:27:47.    Clinical history: Left upper arm weakness, ataxia Blurred vision Ha.    Findings:  Artifact: Note is made of some mild motion artifact which most prominently affects the region of the carotid bifurcations.  Intracranial Vascular structures:  Internal carotid arteries: Mild atheromatous calcification of the cavernous and clinoid segments of the bilateral internal carotid arteries is seen without significant stenosis.  Middle cerebral arteries: Unremarkable.  Anterior cerebral arteries: Unremarkable.  Vertebral arteries: The right vertebral artery is dominant.  Basilar artery: Unremarkable.  Posterior cerebral arteries: There is fetal origin of the left posterior cerebral artery with a hypoplastic P1 segment. The right posterior cerebral artery is unremarkable.  Posterior communicating arteries: Bilateral posterior communicating arteries areseen.  Neck Vascular structures: The visualized aorta and origin of the great vessels of the neck appear  unremarkable.  Carotids:  Common carotid arteries: Unremarkable.  Internal carotid artery: Unremarkable.  Vertebral arteries: Right vertebral artery is dominant. Both vertebral arteries are patent and normal in caliber. The origins of both vertebral arteries are unremarkable.  Brain parenchyma: No abnormal enhancement is identified.    Miscellaneous: The visualized lungs are slightly heterogeneous, which may reflect small airways disease versus mosaic attenuation.      Impression:  1. Unremarkable CT angiogram of the head and neck. Details and other findings as described above.                                         CT HEAD FOR STROKE (Preliminary result)  Result time 12/23/23 20:53:40   Procedure changed from CT Head Without Contrast     Preliminary result by Mike Nobles MD (12/23/23 20:53:40)                   Narrative:    START OF REPORT:  Technique: CT of the head was performed without intravenous contrast with axial as well as coronal and sagittal images.    Comparison: None.    Dosage Information: Automated Exposure Control was utilized 948.65 mGy.cm.    Clinical history: Left upper arm weakness, ataxia Blurred vision Ha.    Findings:  Hemorrhage: No acute intracranial hemorrhage is seen.  CSF spaces: The ventricles, sulci and basal cisterns all appear mildly prominent consistent with global cerebral atrophy.  Brain parenchyma: There is preservation of the grey white junction throughout. No acute infarct is identified. Subtle scattered microvascular change is seen in portions of the periventricular and deep white matter tracts.  Cerebellum: Unremarkable.  Vascular: Atheromatous calcification of the intracranial arteries is seen.  Sella and skull base: The sella appears to be within normal limits for age.  Cerebellopontine angles: Within normal limits.  Herniation: None.  Intracranial calcifications: Incidental note is made of bilateral choroid plexus calcification. Incidental note is made of some pineal  region calcification. Incidental note is made of some calcification of the falx.  Calvarium: No acute linear or depressed skull fracture is seen.    Maxillofacial Structures:  Paranasal sinuses: The visualized paranasal sinuses appear clear with no significant mucoperiosteal thickening or air fluid levels identified.  Orbits: Both native ocular lens are absent.  Zygomatic arches: The zygomatic arches are intact and unremarkable.  Temporal bones and mastoids: The temporal bones and mastoids appear unremarkable.  TMJ: The mandibular condyles appear normally placed with respect to the mandibular fossa.  Nasal Bones: No displaced nasal bone fracture is seen.    Visualized upper cervical spine: The visualized cervical spine appears unremarkable.    Notifications: This is a stroke protocol report and the results were discussed with the emergency room physician (Dr Burt) prior to dictation at 2023-12-23 20:53:23 CST.      Impression:  1. No acute intracranial process identified. Details and other findings as noted above.                                        EKG:  No results found for this visit on 12/23/23.    Telemetry:      Physical Exam  Constitutional:       Appearance: Normal appearance.   Cardiovascular:      Rate and Rhythm: Normal rate and regular rhythm.   Pulmonary:      Effort: Pulmonary effort is normal.   Neurological:      General: No focal deficit present.      Mental Status: She is alert and oriented to person, place, and time.         Home Medications:   No current facility-administered medications on file prior to encounter.     Current Outpatient Medications on File Prior to Encounter   Medication Sig Dispense Refill    ALPRAZolam (XANAX) 0.25 MG tablet Take 0.25 mg by mouth 2 (two) times daily as needed.      aspirin (ECOTRIN) 81 MG EC tablet Take 81 mg by mouth once daily.      ezetimibe (ZETIA) 10 mg tablet Take 10 mg by mouth once daily.      multivit-mins no.63/iron/folic (M-VIT ORAL) Take by  mouth.         Current Inpatient Medications:  No current facility-administered medications for this encounter.         VTE Risk Mitigation (From admission, onward)      None            Assessment:   Sudden Onset Ataxia, Left Vision Changes concerning for Posterior Circulation [facility] s/p TNK administration   hx CVA (2015)  HX PE (previously on Eliquis)  HLD  Carotid Artery Disease      Plan:   Obtain ECHO with bubble study   No known previous Hx of arrhythmias/Afib. Patient previously on oral anticoagulation due to history of PE.   Patient to follow up [facility], Dr. Bragg with 2 week event monitor upon discharge to assess for any arrhythmias/atrial fibrillation  If ECHO benign, will sign off.      Thank you for your consult.     ROSARIO Rangel  Cardiology  [facility]  12/24/2023 8:22 AM     I have seen the patient, reviewed the Nurse Practitioner's note, assessment and plan. I have personally interviewed and examined the patient at bedside and agree with the findings. Medical decision making listed above were done under my guidance.

## 2023-12-24 NOTE — ED PROVIDER NOTES
"Encounter Date: 12/23/2023    SCRIBE #1 NOTE: I, Becky Pulliam, am scribing for, and in the presence of,  Diony Burt MD. I have scribed the following portions of the note - the EKG reading. Other sections scribed: HPI, ROS, PE.       History     Chief Complaint   Patient presents with    stroke like symptoms     Pt reports stroke like symptoms w/ c/o left upper extremity ataxia, left sided blurred vision, headache, and neck pain, LKW 1815. NIH 2, code fast called in triage. Hx CVA and HLD     Patient is a 73 year old female with history of HLD and a CVA in 2015 presents to the ED with neurologic problem. Pt was LSN at 18:15. Pt reports that she was cooking dinner and noticed that her LUE felt "light." When she tried to lift her arm up, it began shaking and she was not able to control her arm.  She states that her symptoms improved within recurred and as a result she called paramedics.  She also reports that she may have noticed a facial droop.  She also complains of left sided blurred vision. Pt is on ASA daily.  Patient states in the past she had a stroke diagnosis 2015, was placed on aspirin Eliquis.  Was taken off Eliquis.  She denies any history of bleeding, falls, recent trauma or recent surgeries.    The history is provided by the patient. No  was used.     Review of patient's allergies indicates:   Allergen Reactions    Gabapentin     Statins-hmg-coa reductase inhibitors      Other reaction(s): Angioedema     Past Medical History:   Diagnosis Date    History of CVA (cerebrovascular accident)     HLD (hyperlipidemia)      Past Surgical History:   Procedure Laterality Date    BLADDER SUSPENSION      CATARACT EXTRACTION      PARS PLANA VITRECTOMY W/ REPAIR OF MACULAR HOLE      PARTIAL HYSTERECTOMY      SPINE SURGERY      TYMPANOSTOMY TUBE PLACEMENT       Family History   Problem Relation Age of Onset    Vitamin D deficiency Father     Cancer Father      Social History     Tobacco Use    " Smoking status: Never    Smokeless tobacco: Never   Substance Use Topics    Alcohol use: Never    Drug use: Never     Review of Systems   Constitutional:  Negative for fever.   HENT:  Negative for sore throat.    Eyes:  Positive for visual disturbance.   Respiratory:  Negative for shortness of breath.    Cardiovascular:  Negative for chest pain.   Gastrointestinal:  Negative for abdominal pain.   Genitourinary:  Negative for dysuria.   Musculoskeletal:  Negative for joint swelling.   Skin:  Negative for rash.   Neurological:  Positive for tremors, weakness and headaches.   Psychiatric/Behavioral:  Negative for confusion.        Physical Exam     Initial Vitals [12/23/23 2029]   BP Pulse Resp Temp SpO2   (!) 179/91 74 20 98.4 °F (36.9 °C) 97 %      MAP       --         Physical Exam    Nursing note and vitals reviewed.  Constitutional: She appears well-developed and well-nourished.   HENT:   Head: Normocephalic and atraumatic.   Eyes: EOM are normal. Pupils are equal, round, and reactive to light.   Neck:   Normal range of motion.  Cardiovascular:  Normal rate, regular rhythm, normal heart sounds and intact distal pulses.           No murmur heard.  Pulmonary/Chest: Breath sounds normal. No respiratory distress. She has no wheezes. She has no rales.   Abdominal: Abdomen is soft. She exhibits no distension. There is no abdominal tenderness. There is no rebound.   Musculoskeletal:         General: No tenderness or edema. Normal range of motion.      Cervical back: Normal range of motion.     Neurological: She is alert. No cranial nerve deficit. GCS score is 15. GCS eye subscore is 4. GCS verbal subscore is 5. GCS motor subscore is 6.   LUE ataxia .  Unable to perform finger-to-nose with left upper extremity.  Finger-to-nose in right upper extremity intact.  5/5 right-sided strength.  4/5 left upper extremity strength.  No facial droop appreciated.   Skin: Skin is warm and dry. Capillary refill takes less than 2  seconds. No rash noted. No erythema.   Psychiatric: She has a normal mood and affect.         ED Course   Critical Care    Date/Time: 12/23/2023 9:18 PM    Performed by: Diony Burt MD  Authorized by: Diony Burt MD  Direct patient critical care time: 15 minutes  Additional history critical care time: 8 minutes  Ordering / reviewing critical care time: 6 minutes  Documentation critical care time: 5 minutes  Consulting other physicians critical care time: 12 minutes  Consult with family critical care time: 8 minutes  Total critical care time (exclusive of procedural time) : 54 minutes  Critical care time was exclusive of separately billable procedures and treating other patients and teaching time.  Critical care was necessary to treat or prevent imminent or life-threatening deterioration of the following conditions: CNS failure or compromise.  Critical care was time spent personally by me on the following activities: development of treatment plan with patient or surrogate, discussions with consultants, interpretation of cardiac output measurements, evaluation of patient's response to treatment, examination of patient, obtaining history from patient or surrogate, ordering and performing treatments and interventions, ordering and review of laboratory studies, ordering and review of radiographic studies, pulse oximetry, re-evaluation of patient's condition and review of old charts.        Labs Reviewed   COMPREHENSIVE METABOLIC PANEL - Abnormal; Notable for the following components:       Result Value    Glucose Level 135 (*)     Protein Total 7.8 (*)     All other components within normal limits   CBC WITH DIFFERENTIAL - Abnormal; Notable for the following components:    MPV 10.6 (*)     All other components within normal limits   POCT GLUCOSE - Abnormal; Notable for the following components:    POCT Glucose 124 (*)     All other components within normal limits   ISTAT CHEM8 - Abnormal; Notable for the  following components:    POC Glucose 138 (*)     POC Anion Gap 18 (*)     All other components within normal limits   PROTIME-INR - Normal   TSH - Normal   APTT - Normal   TROPONIN I - Normal   B-TYPE NATRIURETIC PEPTIDE - Normal   CBC W/ AUTO DIFFERENTIAL    Narrative:     The following orders were created for panel order CBC W/ AUTO DIFFERENTIAL.  Procedure                               Abnormality         Status                     ---------                               -----------         ------                     CBC with Differential[[id number]]       Abnormal            Final result                 Please view results for these tests on the individual orders.   LIPID PANEL   POCT GLUCOSE, HAND-HELD DEVICE   ISTAT PROCEDURE     EKG Readings: (Independently Interpreted)   Initial Reading: No STEMI. Rhythm: Normal Sinus Rhythm. Heart Rate: 76. Ectopy: No Ectopy. Conduction: Normal. ST Segments: Normal ST Segments. T Waves: Normal. Clinical Impression: Normal Sinus Rhythm   EKG performed at 20:41       Imaging Results              MRI Brain Without Contrast (Preliminary result)  Result time 12/23/23 23:52:05      Preliminary result by Mike Nobles MD (12/23/23 23:52:05)                   Narrative:    START OF REPORT:  Technique: Multiplanar, multisequence magnetic resonance imaging of the brain was performed without intravenous contrast.    Comparison: Correlation is with CT study dated2023-12-23.    Clinical history: Stroke follow up.    Findings:  Hemorrhage: No acute intracranial hemorrhage is identified.  Stroke: No abnormal signal is identified on the diffusion images to suggest acute infarct.  Extra axial spaces: The ventricles and sulci and basal cisterns all appear mildly prominent consistent with global cerebral atrophy.  Cerebral, cerebellar, and brainstem parenchyma: Mild periventricular and subcortical white matter signal abnormalities are seen. The main consideration is small vessel ischemic  changes in a patient of this age.  Cranial nerves: Normal.  Herniation: None.  Calvarium: Within normal limits.  Vascular system: Normal flow voids.  Visualized paranasal sinuses: Normal.  Visualized orbits: The visualized orbits appear unremarkable.  Sella and skull base: Normal.  Temporal bones and mastoids: Within normal limits.      Impression:  1. No abnormal signal is identified on the diffusion images to suggest acute infarct.  2. No acute intracranial process is identified. Details and other findings as discussed above.                                         CTA Head and Neck (xpd) (Preliminary result)  Result time 12/23/23 21:10:18      Preliminary result by Mike Nobles MD (12/23/23 21:10:18)                   Narrative:    START OF REPORT:  Technique: CT angiogram of the intracranial vessels was performed without and with intravenous contrast with direct axial as well as sagittal and coronal reformations. CT angiogram of the neck vessels was performed without and with intravenous contrast with direct axial as well as sagittal and coronal reformations.    Comparison: Comparison is with noncontrast CT head dated2023-12-23 20:27:47.    Clinical history: Left upper arm weakness, ataxia Blurred vision Ha.    Findings:  Artifact: Note is made of some mild motion artifact which most prominently affects the region of the carotid bifurcations.  Intracranial Vascular structures:  Internal carotid arteries: Mild atheromatous calcification of the cavernous and clinoid segments of the bilateral internal carotid arteries is seen without significant stenosis.  Middle cerebral arteries: Unremarkable.  Anterior cerebral arteries: Unremarkable.  Vertebral arteries: The right vertebral artery is dominant.  Basilar artery: Unremarkable.  Posterior cerebral arteries: There is fetal origin of the left posterior cerebral artery with a hypoplastic P1 segment. The right posterior cerebral artery is unremarkable.  Posterior  communicating arteries: Bilateral posterior communicating arteries areseen.  Neck Vascular structures: The visualized aorta and origin of the great vessels of the neck appear unremarkable.  Carotids:  Common carotid arteries: Unremarkable.  Internal carotid artery: Unremarkable.  Vertebral arteries: Right vertebral artery is dominant. Both vertebral arteries are patent and normal in caliber. The origins of both vertebral arteries are unremarkable.  Brain parenchyma: No abnormal enhancement is identified.    Miscellaneous: The visualized lungs are slightly heterogeneous, which may reflect small airways disease versus mosaic attenuation.      Impression:  1. Unremarkable CT angiogram of the head and neck. Details and other findings as described above.                                         CT HEAD FOR STROKE (Preliminary result)  Result time 12/23/23 20:53:40   Procedure changed from CT Head Without Contrast     Preliminary result by Mike Nobles MD (12/23/23 20:53:40)                   Narrative:    START OF REPORT:  Technique: CT of the head was performed without intravenous contrast with axial as well as coronal and sagittal images.    Comparison: None.    Dosage Information: Automated Exposure Control was utilized 948.65 mGy.cm.    Clinical history: Left upper arm weakness, ataxia Blurred vision Ha.    Findings:  Hemorrhage: No acute intracranial hemorrhage is seen.  CSF spaces: The ventricles, sulci and basal cisterns all appear mildly prominent consistent with global cerebral atrophy.  Brain parenchyma: There is preservation of the grey white junction throughout. No acute infarct is identified. Subtle scattered microvascular change is seen in portions of the periventricular and deep white matter tracts.  Cerebellum: Unremarkable.  Vascular: Atheromatous calcification of the intracranial arteries is seen.  Sella and skull base: The sella appears to be within normal limits for age.  Cerebellopontine angles:  Within normal limits.  Herniation: None.  Intracranial calcifications: Incidental note is made of bilateral choroid plexus calcification. Incidental note is made of some pineal region calcification. Incidental note is made of some calcification of the falx.  Calvarium: No acute linear or depressed skull fracture is seen.    Maxillofacial Structures:  Paranasal sinuses: The visualized paranasal sinuses appear clear with no significant mucoperiosteal thickening or air fluid levels identified.  Orbits: Both native ocular lens are absent.  Zygomatic arches: The zygomatic arches are intact and unremarkable.  Temporal bones and mastoids: The temporal bones and mastoids appear unremarkable.  TMJ: The mandibular condyles appear normally placed with respect to the mandibular fossa.  Nasal Bones: No displaced nasal bone fracture is seen.    Visualized upper cervical spine: The visualized cervical spine appears unremarkable.    Notifications: This is a stroke protocol report and the results were discussed with the emergency room physician (Dr Burt) prior to dictation at 2023-12-23 20:53:23 CST.      Impression:  1. No acute intracranial process identified. Details and other findings as noted above.                                         Medications   tenecteplase (TNKase) IV KIT 16.5 mg (16.5 mg Intravenous Given 12/23/23 2115)   LORazepam injection 0.5 mg (0.5 mg Intravenous Given 12/23/23 2243)     Medical Decision Making  Amount and/or Complexity of Data Reviewed  External Data Reviewed: notes.  Labs: ordered.  Radiology: ordered.    Risk  Prescription drug management.  Decision regarding hospitalization.      Additional MDM:     NIH Stroke Scale:   Level of consciousness = 0 - alert  LOC questions = 0 - answers both correctly  LOC commands = 0 - performs both correctly  Best gaze = 0 - normal  Visual = 0 - no visual loss  Facial palsy = 0 - normal  Motor left arm =  0 - no drift  Motor right arm =  0 - no drift  Motor  left leg = 0 - no drift  Motor right leg =  0 - no drift  Limb ataxia = 1 - present in one limb  Sensory = 0 - normal  Best language = 0 - no aphasia  Dysarthria = 0 - normal articulation  Extinction and inattention = 0 - no neglect  NIH Stroke Scale Total = 1             Attending Attestation:           Physician Attestation for Scribe:  Physician Attestation Statement for Scribe #1: I, Diony Burt MD, reviewed documentation, as scribed by Becky Pulliam in my presence, and it is both accurate and complete.                        Medical Decision Making:   History:   I obtained history from: someone other than patient.       <> Summary of History: Collateral from the patient's son as well as the patient's daughter.  Old Medical Records: I decided to obtain old medical records.  Old Records Summarized: records from clinic visits, records from previous admission(s) and records from another hospital.       <> Summary of Records: Reviewed old records, history of CVA, reviewed old MRI from 2015.  Initial Assessment:   Weakness  Differential Diagnosis:   Judging by the patient's chief complaint and pertinent history, the patient has the following possible differential diagnoses, including but not limited to the following.  Some of these are deemed to be lower likelihood and some more likely based on my physical exam and history combined with possible lab work and/or imaging studies.   Please see the pertinent studies, and refer to the HPI.  Some of these diagnoses will take further evaluation to fully rule out, perhaps as an outpatient and the patient was encouraged to follow up when discharged for more comprehensive evaluation.    Generalized weakness: anemia, dehydration, electrolyte derangement, hypoglycemia, infection, intoxication, respiratory failure, toxic ingestion, ACS, thyroid disease, medications, psychiatric, autoimmune, rhabdomyolysis, myositis, peripheral neuropathy   Focal weakness: CVA, ICH,    Independently Interpreted Test(s):   I have ordered and independently interpreted EKG Reading(s) - see prior notes  Clinical Tests:   Lab Tests: Ordered and Reviewed  Radiological Study: Ordered and Reviewed  Medical Tests: Ordered and Reviewed  ED Management:    Patient is a 73-year-old female presents to the emergency department for left upper extremity ataxia see HPI.  See physical exam.  Initial NIH of 1-2.  She has marked difficulty with controlling her left upper extremity.  There is significant ataxia.  She has strength intact on right side.  No facial droop appreciated.  She was with within the window for thrombolytics therapy.  As a result code fast called.  CT of the head without any acute abnormalities.  CTA without any evidence of large vessel occlusion.  Patient's daughter who is a RN as well as the patient's son at bedside.  Discussed option of thrombolytics therapy.  Patient appears to have had some left upper extremity ataxia starting approximately 615 which improved but then recurred.  She had ataxia on arrival.  She remains within the window for thrombolytics therapy.  Discussed with Neurology, Dr. Mack.  Will give TNK.  Shared decision making used to determine thrombolytics therapy.  After speaking with daughter who has an RN, given concern for possible stuttering stroke will give TNkase.   Discussed with ICU who will admit the patient.  MRI ordered.  All results discussed with the patient and family.  Answered all questions time.  Verbalized understanding agreed to plan.  Other:   I have discussed this case with another health care provider.       <> Summary of the Discussion: Discussed case with ICU who will evaluate admit patient.             Clinical Impression:  Final diagnoses:  [R29.818] Acute focal neurological deficit (Primary)  [I63.9] CVA (cerebral vascular accident)  [R27.0] Ataxia of left upper extremity          ED Disposition Condition    Admit Stable                Javed  Diony AYALA MD  12/23/23 2570

## 2023-12-24 NOTE — H&P
"[facility] - ICU  Pulmonary Critical Care Note    Patient Name: Hilda Ordonez  MRN: [id number]  Admission Date: 12/23/2023  Hospital Length of Stay: 0 days  Code Status: No Order  Attending Provider: JAIME Donahue MD  Primary Care Provider: Valentin Diop MD     Subjective:     HPI: This is a 74 yo female with hx of stroke (2015), HLD presented to [facility] ED 12/23/2023 due to complaints of left upper extremity ataxia, and left sided vision changes.    Per chart ED report, patient's LKW was 18:15 today, when she began experiencing "light" sensation with acute onset while cooking. This was associated with left blurred vision, headache, and neck pain. Per additional history by ED physician she is on aspirin daily. Reportedly took anticoagulants previously.    In the ED patient was initially hypertensive to 179/91, however most recent VSS were normal. NIHSS was 2. Labs were unremarkable with normal PT/INR, APTT, CBC, CMP, TSH, BNP, Troponin. CT Head and CTA unremarkable for hemorrhage or LVO. Given patient was within the window for TNK administration and after speaking with Neurology, TNK was administered. Critical care was consulted for ICU admission for post fibronolytic monitoring.      Hospital Course/Significant events:  12/23/2023: Given TNK, Admitted to ICU for post fibronolytic monitoring     24 Hour Interval History:      Past Medical History:   Diagnosis Date    History of CVA (cerebrovascular accident)     HLD (hyperlipidemia)        Past Surgical History:   Procedure Laterality Date    BLADDER SUSPENSION      CATARACT EXTRACTION      PARS PLANA VITRECTOMY W/ REPAIR OF MACULAR HOLE      PARTIAL HYSTERECTOMY      SPINE SURGERY      TYMPANOSTOMY TUBE PLACEMENT         Social History     Socioeconomic History    Marital status:    Tobacco Use    Smoking status: Never    Smokeless tobacco: Never   Substance and Sexual Activity    Alcohol use: Never    Drug use: Never           Objective: "     Current Outpatient Medications   Medication Instructions    ALPRAZolam (XANAX) 0.25 mg, Oral, 2 times daily PRN    aspirin (ECOTRIN) 81 mg, Oral, Daily    ezetimibe (ZETIA) 10 mg, Oral, Daily    multivit-mins no.63/iron/folic (M-VIT ORAL) Oral       Current Inpatient Medications          Intake/Output Summary (Last 24 hours) at 12/23/2023 2231  Last data filed at 12/23/2023 2142  Gross per 24 hour   Intake --   Output 400 ml   Net -400 ml       Review of Systems   Eyes:  Positive for blurred vision.   Neurological:  Positive for tremors and headaches.        LUE ataxia        Vital Signs (Most Recent):  Temp: 98.4 °F (36.9 °C) (12/23/23 2029)  Pulse: 67 (12/23/23 2215)  Resp: 18 (12/23/23 2215)  BP: 134/77 (12/23/23 2215)  SpO2: 95 % (12/23/23 2215)  Body mass index is 23.45 kg/m².  Weight: 65.9 kg (145 lb 4.5 oz) Vital Signs (24h Range):  Temp:  [98.4 °F (36.9 °C)] 98.4 °F (36.9 °C)  Pulse:  [67-77] 67  Resp:  [15-23] 18  SpO2:  [95 %-99 %] 95 %  BP: (133-179)/(72-99) 134/77     Physical Exam  Constitutional:       Appearance: Normal appearance.   HENT:      Head: Normocephalic and atraumatic.   Eyes:      Extraocular Movements: Extraocular movements intact.      Conjunctiva/sclera: Conjunctivae normal.      Pupils: Pupils are equal, round, and reactive to light.   Cardiovascular:      Rate and Rhythm: Normal rate and regular rhythm.   Pulmonary:      Effort: Pulmonary effort is normal.      Breath sounds: Normal breath sounds.   Abdominal:      General: Abdomen is flat.   Musculoskeletal:         General: Swelling (left UE third digit ecchymoses and swelling) present.      Cervical back: Normal range of motion.   Skin:     General: Skin is warm.      Capillary Refill: Capillary refill takes less than 2 seconds.   Neurological:      Mental Status: She is alert.      Comments: Alert Oriented to Person, Place, Time, and Purpose  CN II-XII intact (no VF changes on confrontation)  Strength 5/5 on all  "extremities  Equal to light touch throughout  No dysmetria, no dysdiadochokinesia, intention tremor on approach and retrieval on Left FTN test, Heel to Shin well  Gait Deferred             Mechanical ventilation support:   None    Lines/Drains/Airways       Drain  Duration             Female External Urinary Catheter w/ Suction -- days              Peripheral Intravenous Line  Duration                  Peripheral IV - Single Lumen 12/23/23 2020 20 G Anterior;Distal;Right Forearm <1 day         Peripheral IV - Single Lumen 12/23/23 2114 20 G Anterior;Left Forearm <1 day                    Significant Labs:    Lab Results   Component Value Date    WBC 6.29 12/23/2023    HGB 13.1 12/23/2023    HCT 39.5 12/23/2023    MCV 89.6 12/23/2023     12/23/2023         BMP  Lab Results   Component Value Date     12/23/2023    K 3.9 12/23/2023    CO2 26 12/23/2023    BUN 15.1 12/23/2023    CREATININE 0.97 12/23/2023    CALCIUM 9.5 12/23/2023    EGFRNONAA >60 06/08/2022       ABG  No results for input(s): "PH", "PO2", "PCO2", "HCO3", "BE" in the last 168 hours.        Significant Imaging:  CTA Head and Neck (xpd) 12/23/2023 (Preliminary)  This result has not been signed. Information might be incomplete.    Narrative  START OF REPORT:  Technique: CT angiogram of the intracranial vessels was performed without and with intravenous contrast with direct axial as well as sagittal and coronal reformations. CT angiogram of the neck vessels was performed without and with intravenous contrast with direct axial as well as sagittal and coronal reformations.    Comparison: Comparison is with noncontrast CT head dated2023-12-23 20:27:47.    Clinical history: Left upper arm weakness, ataxia Blurred vision Ha.    Findings:  Artifact: Note is made of some mild motion artifact which most prominently affects the region of the carotid bifurcations.  Intracranial Vascular structures:  Internal carotid arteries: Mild atheromatous " calcification of the cavernous and clinoid segments of the bilateral internal carotid arteries is seen without significant stenosis.  Middle cerebral arteries: Unremarkable.  Anterior cerebral arteries: Unremarkable.  Vertebral arteries: The right vertebral artery is dominant.  Basilar artery: Unremarkable.  Posterior cerebral arteries: There is fetal origin of the left posterior cerebral artery with a hypoplastic P1 segment. The right posterior cerebral artery is unremarkable.  Posterior communicating arteries: Bilateral posterior communicating arteries areseen.  Neck Vascular structures: The visualized aorta and origin of the great vessels of the neck appear unremarkable.  Carotids:  Common carotid arteries: Unremarkable.  Internal carotid artery: Unremarkable.  Vertebral arteries: Right vertebral artery is dominant. Both vertebral arteries are patent and normal in caliber. The origins of both vertebral arteries are unremarkable.  Brain parenchyma: No abnormal enhancement is identified.    Miscellaneous: The visualized lungs are slightly heterogeneous, which may reflect small airways disease versus mosaic attenuation.    Impression  1. Unremarkable CT angiogram of the head and neck. Details and other findings as described above.    MRI Brain Without Contrast 12/23/2023 (Preliminary)  This result has not been signed. Information might be incomplete.    Narrative  START OF REPORT:  Technique: Multiplanar, multisequence magnetic resonance imaging of the brain was performed without intravenous contrast.    Comparison: Correlation is with CT study dated2023-12-23.    Clinical history: Stroke follow up.    Findings:  Hemorrhage: No acute intracranial hemorrhage is identified.  Stroke: No abnormal signal is identified on the diffusion images to suggest acute infarct.  Extra axial spaces: The ventricles and sulci and basal cisterns all appear mildly prominent consistent with global cerebral atrophy.  Cerebral, cerebellar,  and brainstem parenchyma: Mild periventricular and subcortical white matter signal abnormalities are seen. The main consideration is small vessel ischemic changes in a patient of this age.  Cranial nerves: Normal.  Herniation: None.  Calvarium: Within normal limits.  Vascular system: Normal flow voids.  Visualized paranasal sinuses: Normal.  Visualized orbits: The visualized orbits appear unremarkable.  Sella and skull base: Normal.  Temporal bones and mastoids: Within normal limits.    Impression  1. No abnormal signal is identified on the diffusion images to suggest acute infarct.  2. No acute intracranial process is identified. Details and other findings as discussed above.          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 S/P TNK administration  HLD  Hx of previous stroke (2015) with similar findings  Anxiety      Plan  - Admit to ICU  - Neurology consulted in the ED, appreciate additional recommendations  - Keep BP <180/110 mmHg  - Neurochecks q1h  - Keep HOB elevated to 30 degrees  - Hold ASA first 24 hours after TNK, then restart  - Plavix for 21 days  - CT Head, CTA, and MRI all negative at this time    DVT Prophylaxis: SCD  GI Prophylaxis: none          Kane Montelongo MD  Pulmonary Critical Care Medicine  [facility] - ICU

## 2023-12-24 NOTE — PT/OT/SLP EVAL
[facility]  Speech Language Pathology Department  Cognitive-Communication Evaluation    Patient Name:  Hilda Ordonez   MRN:  [id number]    Recommendations:     General recommendations:  SLP intervention not indicated  Communication strategies:  go to room if call light pushed    Discharge therapy intensity: No Therapy Indicated  Barriers to safe discharge: acuity of illness    History:     Hilda Ordonez is a/n 73 y.o. female hx of stroke (2015), HLD presented to [facility] ED 12/23/2023 due to complaints of left upper extremity ataxia, and left sided vision changes.     Past Medical History:   Diagnosis Date    History of CVA (cerebrovascular accident)     HLD (hyperlipidemia)      Past Surgical History:   Procedure Laterality Date    BLADDER SUSPENSION      CATARACT EXTRACTION      PARS PLANA VITRECTOMY W/ REPAIR OF MACULAR HOLE      PARTIAL HYSTERECTOMY      SPINE SURGERY      TYMPANOSTOMY TUBE PLACEMENT         Previous level of Function  Education: associates degree  Occupation: employee at [employer]   Lives: with children  Handed: Right  Glasses: yes  Hearing Aids: no  Home Responsibilities: drives, financial management, medication/health management, laundry, shopping, meal preparation, and cleaning    Imaging   Results for orders placed during the hospital encounter of 12/23/23    MRI Brain Without Contrast (Preliminary)  This result has not been signed. Information might be incomplete.    Narrative  START OF REPORT:  Technique: Multiplanar, multisequence magnetic resonance imaging of the brain was performed without intravenous contrast.    Comparison: Correlation is with CT study dated2023-12-23.    Clinical history: Stroke follow up.    Findings:  Hemorrhage: No acute intracranial hemorrhage is identified.  Stroke: No abnormal signal is identified on the diffusion images to suggest acute infarct.  Extra axial spaces: The ventricles and sulci and basal cisterns all appear mildly  prominent consistent with global cerebral atrophy.  Cerebral, cerebellar, and brainstem parenchyma: Mild periventricular and subcortical white matter signal abnormalities are seen. The main consideration is small vessel ischemic changes in a patient of this age.  Cranial nerves: Normal.  Herniation: None.  Calvarium: Within normal limits.  Vascular system: Normal flow voids.  Visualized paranasal sinuses: Normal.  Visualized orbits: The visualized orbits appear unremarkable.  Sella and skull base: Normal.  Temporal bones and mastoids: Within normal limits.    Impression  1. No abnormal signal is identified on the diffusion images to suggest acute infarct.  2. No acute intracranial process is identified. Details and other findings as discussed above.    Subjective     Patient alert and cooperative.    Patient goals: to return home at [facility] [address] [facility]     Spiritual/Cultural/Pentecostal Beliefs/Practices that affect care: no  Pain/Comfort: Pain Rating 1: 0/10  Respiratory Status: Room air    Objective:     ORAL MUSCULATURE  Dentition:  adequate  Facial Movement: WFL  Buccal Strength & Mobility: WFL  Mandibular Strength & Mobility: WFL  Oral Labial Strength & Mobility: WFL  Lingual Strength & Mobility: WFL  Velar Elevation: WFL    SPEECH PRODUCTION  Phoneme Production: adequate  Voice Quality: adequate  Voice Production: adequate  Speech Rate: appropriate  Loudness: acceptable  Respiration: WFL for speech  Resonance: adequate  Prosody: adequate  Speech Intelligibility  Known Context: Greater that 90%  Unknown Context: Greater that 90%    AUDITORY COMPREHENSION  Identification:  Objects: 100%  Following Directions:  1-Step: 100%  2-Step: 100%  Yes/No Questions:  Biographical: 100%  Environmental: 100%  Simple: 100%  Complex: 100%    VERBAL EXPRESSION  Automatic Speech:  Days of the week: 100%  Months of the year: 100%  Countin% (1-10)  Confrontation Naming  Body Parts: 100%  Objects: 100%  Wh- Questions:  Object name:  100%  Object function: 100%    COGNITION  Orientation:  Person: yes  Place: yes  Time: yes  Situation: yes   Attention:  Focused: Within Functional Limits  Sustained: Within Functional Limits  Pragmatics:  Eye contact: Within Functional Limits  Personal space: Within Functional Limits  Communicative Intent: Within Functional Limits  Memory:  Immediate: Within Functional Limits  Delayed: Within Functional Limits  Long Term: Within Functional Limits  Problem Solving  Functional simple: Within Functional Limits  Organization:  Convergent thinking: Within Functional Limits  Divergent thinking: Within Functional Limits  Executive Function:  Information processing: Within Functional Limits  Reasoning: Within Functional Limits    Assessment:     Pt present WFL for speech, language, and cognition. She express no concerns at this time. Will sign off no further skilled SLP services are warranted. Pt passed [place] swallow screen and to resume her diet at [facility] when appropriate.     Patient Education:     Patient provided with verbal education regarding results.  Understanding was verbalized.    Plan:     SLP Follow-Up:  No   Plan of Care reviewed with:  patient      Time Tracking:     SLP Treatment Date:   12/24/23  Speech Start Time:  0945  Speech Stop Time:  1015     Speech Total Time (min):  30 min    Billable minutes:  Evaluation of Speech Sound Production with Comprehension and Expression, 30 minutes     12/24/2023

## 2023-12-24 NOTE — ASSESSMENT & PLAN NOTE
-presented with left sided weakness, left eye blurry vision   -Stroke RF: hx of stroke, HLD (allergy to statins)  -intervention: TNK at 2115 on 12/23  -Etiology: ESUS (suspicious for cardioembolic etiology)       Stroke workup  -CT head: negative  -CTA head and neck: negative for LVO or flow limiting stenosis  -MRI brain: negative  -LDL: 106  -TSH: 1.480    Plan  - continue stroke workup  - therapy evals tomorrow  - can likely discharge tomorrow  - will need to follow up with cardiology, needs a 30 day monitor or loop recorder  - MRI brain images reviewed appears to have an old cerebellar infarct, but overall brain looks pristine without small vessel disease   - recommend aspirin 325 mg daily upon discharge (she was taking aspirin 81 mg at home)  - encouraged to return to work AFTER the new year

## 2023-12-24 NOTE — HPI
"73 year old female with a past medical history of stroke (2015, no residual deficits), hx of PE (2015), and HLD presented to ED on 12/23 for LUE coordination problems and visual changes in the left eye. She reported she had a normal day yesterday, she went to work and got off of work around 4:00 pm. She went to the grocery store on the way home. She got home and started cooking around 5:00 pm. She reported around 6:15 pm she went to sit down in her chair in the living room and suddenly her left arm felt weightless. She also felt like her LUE was not coordinated and it was just "flopping around".  She walked back to the kitchen to rinse a plate but had a difficult time turning the faucet on with her left hand, it took her several attempts. She went to the restroom to look at her face in the mirror, she did not have any facial droop. When she picked up her right and left arm, she reports her right arm went up straight, no problems but her left arm she was uncoordinated with and it seemed like it was going higher than her right arm. She later developed blurred vision, HA, and neck pain. She called her son to bring her to the hospital because she knew there was a limited amount of time she had to get to the hospital. Upon arrival to ED, /91. CT head was negative. CTA head and neck negative for LVO. She was given TNK at 2115. Neurology was consulted for stroke workup.       Of note, she has seen Dr. Griffith outpatient for her previous stroke and a myasthenia workup which was negative (7/2022). In 2015, she was in [place] visiting family and had chest pain, she had a workup that was negative and she was discharged home. She came home but had persistent chest pain so she came to [facility] where she was found to have a pulmonary embolism. She was put on anticoagulation, workup was otherwise unremarkable. She was discharged on Eliquis. On December 3 2015, she developed HA, LUE ataxia and LSW. MRI brain showed possible infarct " in the right centrum semiovale. She had a hypercoagulable workup at that time with elevated homocystine. She was sent to hematology for further evaluation and placed on folic acid supplementation. She was on aspirin and Eliquis until 2 years ago, she reports her PCP did not want her on Eliquis but her cardiologist wanted her on Eliquis. She stopped taking Eliquis about 2 years ago and has remained on aspirin 81 mg daily. She did report prior to the CP and visit to family in [place], she went to [place]. She sat on her flight to [place] for a prolonged period of time without getting up to go to the restroom or walk around because she did not want to disturb the people who were sitting next to her.

## 2023-12-24 NOTE — PLAN OF CARE
Problem: Adult Inpatient Plan of Care  Goal: Plan of Care Review  Outcome: Ongoing, Progressing  Goal: Patient-Specific Goal (Individualized)  Outcome: Ongoing, Progressing  Flowsheets (Taken 12/24/2023 4883)  Individualized Care Needs: go home for maxwell with no deficits  Goal: Absence of Hospital-Acquired Illness or Injury  Outcome: Ongoing, Progressing  Goal: Optimal Comfort and Wellbeing  Outcome: Ongoing, Progressing  Goal: Readiness for Transition of Care  Outcome: Ongoing, Progressing     Problem: Adjustment to Illness (Stroke, Ischemic/Transient Ischemic Attack)  Goal: Optimal Coping  Outcome: Ongoing, Progressing     Problem: Bowel Elimination Impaired (Stroke, Ischemic/Transient Ischemic Attack)  Goal: Effective Bowel Elimination  Outcome: Ongoing, Progressing     Problem: Cerebral Tissue Perfusion (Stroke, Ischemic/Transient Ischemic Attack)  Goal: Optimal Cerebral Tissue Perfusion  Outcome: Ongoing, Progressing     Problem: Cognitive Impairment (Stroke, Ischemic/Transient Ischemic Attack)  Goal: Optimal Cognitive Function  Outcome: Ongoing, Progressing     Problem: Communication Impairment (Stroke, Ischemic/Transient Ischemic Attack)  Goal: Improved Communication Skills  Outcome: Ongoing, Progressing     Problem: Functional Ability Impaired (Stroke, Ischemic/Transient Ischemic Attack)  Goal: Optimal Functional Ability  Outcome: Ongoing, Progressing     Problem: Sensorimotor Impairment (Stroke, Ischemic/Transient Ischemic Attack)  Goal: Improved Sensorimotor Function  Outcome: Ongoing, Progressing

## 2023-12-24 NOTE — PLAN OF CARE
Problem: Adult Inpatient Plan of Care  Goal: Plan of Care Review  Outcome: Ongoing, Progressing  Flowsheets (Taken 12/24/2023 0202)  Plan of Care Reviewed With: patient  Goal: Patient-Specific Goal (Individualized)  Outcome: Ongoing, Progressing  Goal: Absence of Hospital-Acquired Illness or Injury  Outcome: Ongoing, Progressing  Goal: Optimal Comfort and Wellbeing  Outcome: Ongoing, Progressing  Goal: Readiness for Transition of Care  Outcome: Ongoing, Progressing  Intervention: Mutually Develop Transition Plan  Flowsheets (Taken 12/24/2023 0202)  Do you expect to return to your current living situation?: Yes  Do you have help at home or someone to help you manage your care at home?: Yes     Problem: Adjustment to Illness (Stroke, Ischemic/Transient Ischemic Attack)  Goal: Optimal Coping  Outcome: Ongoing, Progressing     Problem: Bowel Elimination Impaired (Stroke, Ischemic/Transient Ischemic Attack)  Goal: Effective Bowel Elimination  Outcome: Ongoing, Progressing     Problem: Cerebral Tissue Perfusion (Stroke, Ischemic/Transient Ischemic Attack)  Goal: Optimal Cerebral Tissue Perfusion  Outcome: Ongoing, Progressing  Intervention: Protect and Optimize Cerebral Perfusion  Flowsheets (Taken 12/24/2023 0202)  Sensory Stimulation Regulation:   auditory stimulation minimized   lighting decreased   quiet environment promoted  Cerebral Perfusion Promotion:   blood pressure monitored   normothermia promoted     Problem: Cognitive Impairment (Stroke, Ischemic/Transient Ischemic Attack)  Goal: Optimal Cognitive Function  Outcome: Ongoing, Progressing  Intervention: Optimize Cognitive Function  Flowsheets (Taken 12/24/2023 0202)  Sensory Stimulation Regulation:   auditory stimulation minimized   lighting decreased   quiet environment promoted     Problem: Communication Impairment (Stroke, Ischemic/Transient Ischemic Attack)  Goal: Improved Communication Skills  Outcome: Ongoing, Progressing     Problem: Functional  Ability Impaired (Stroke, Ischemic/Transient Ischemic Attack)  Goal: Optimal Functional Ability  Outcome: Ongoing, Progressing     Problem: Respiratory Compromise (Stroke, Ischemic/Transient Ischemic Attack)  Goal: Effective Oxygenation and Ventilation  Outcome: Met     Problem: Sensorimotor Impairment (Stroke, Ischemic/Transient Ischemic Attack)  Goal: Improved Sensorimotor Function  Outcome: Ongoing, Progressing     Problem: Swallowing Impairment (Stroke, Ischemic/Transient Ischemic Attack)  Goal: Optimal Eating and Swallowing without Aspiration  Outcome: Met     Problem: Urinary Elimination Impaired (Stroke, Ischemic/Transient Ischemic Attack)  Goal: Effective Urinary Elimination  Outcome: Met

## 2023-12-24 NOTE — CONSULTS
"[facility]  Neurology  Consult Note    Patient Name: Hilda Ordonez  MRN: [id number]  Admission Date: 12/23/2023  Hospital Length of Stay: 1 days  Code Status: Full Code   Attending Provider: JAIME Donahue MD   Consulting Provider: Zhane Hair NP  Primary Care Physician: Valentin Diop MD  Principal Problem:<principal problem not specified>    Inpatient consult to Neurology  Consult performed by: Zhane Hair NP  Consult ordered by: Kane Montelongo MD         Subjective:     Chief Complaint:  left sided weakness     HPI:   73 year old female with a past medical history of stroke (2015, no residual deficits), hx of PE (2015), and HLD presented to ED on 12/23 for LUE coordination problems and visual changes in the left eye. She reported she had a normal day yesterday, she went to work and got off of work around 4:00 pm. She went to the grocery store on the way home. She got home and started cooking around 5:00 pm. She reported around 6:15 pm she went to sit down in her chair in the living room and suddenly her left arm felt weightless. She also felt like her LUE was not coordinated and it was just "flopping around".  She walked back to the kitchen to rinse a plate but had a difficult time turning the faucet on with her left hand, it took her several attempts. She went to the restroom to look at her face in the mirror, she did not have any facial droop. When she picked up her right and left arm, she reports her right arm went up straight, no problems but her left arm she was uncoordinated with and it seemed like it was going higher than her right arm. She later developed blurred vision, HA, and neck pain. She called her son to bring her to the hospital because she knew there was a limited amount of time she had to get to the hospital. Upon arrival to ED, /91. CT head was negative. CTA head and neck negative for LVO. She was given TNK at 2115. Neurology was " consulted for stroke workup.       Of note, she has seen Dr. Griffith outpatient for her previous stroke and a myasthenia workup which was negative (7/2022). In 2015, she was in [place] visiting family and had chest pain, she had a workup that was negative and she was discharged home. She came home but had persistent chest pain so she came to [facility] where she was found to have a pulmonary embolism. She was put on anticoagulation, workup was otherwise unremarkable. She was discharged on Eliquis. On December 3 2015, she developed HA, LUE ataxia and LSW. MRI brain showed possible infarct in the right centrum semiovale. She had a hypercoagulable workup at that time with elevated homocystine. She was sent to hematology for further evaluation and placed on folic acid supplementation. She was on aspirin and Eliquis until 2 years ago, she reports her PCP did not want her on Eliquis but her cardiologist wanted her on Eliquis. She stopped taking Eliquis about 2 years ago and has remained on aspirin 81 mg daily. She did report prior to the CP and visit to family in [place], she went to [place]. She sat on her flight to [place] for a prolonged period of time without getting up to go to the restroom or walk around because she did not want to disturb the people who were sitting next to her.        Past Medical History:   Diagnosis Date    History of CVA (cerebrovascular accident)     HLD (hyperlipidemia)        Past Surgical History:   Procedure Laterality Date    BLADDER SUSPENSION      CATARACT EXTRACTION      PARS PLANA VITRECTOMY W/ REPAIR OF MACULAR HOLE      PARTIAL HYSTERECTOMY      SPINE SURGERY      TYMPANOSTOMY TUBE PLACEMENT         Review of patient's allergies indicates:   Allergen Reactions    Gabapentin     Statins-hmg-coa reductase inhibitors      Other reaction(s): Angioedema       Current Neurological Medications:     No current facility-administered medications on file prior to encounter.     Current Outpatient  Medications on File Prior to Encounter   Medication Sig    ALPRAZolam (XANAX) 0.25 MG tablet Take 0.25 mg by mouth 2 (two) times daily as needed.    aspirin (ECOTRIN) 81 MG EC tablet Take 81 mg by mouth once daily.    ezetimibe (ZETIA) 10 mg tablet Take 10 mg by mouth once daily.    multivit-mins no.63/iron/folic (M-VIT ORAL) Take by mouth.     Family History       Problem Relation (Age of Onset)    Cancer Father    Vitamin D deficiency Father          Tobacco Use    Smoking status: Never    Smokeless tobacco: Never   Substance and Sexual Activity    Alcohol use: Never    Drug use: Never    Sexual activity: Not on file     Review of Systems   Neurological:  Negative for dizziness, tremors, seizures, syncope, facial asymmetry, speech difficulty, weakness, light-headedness, numbness and headaches.   All other systems reviewed and are negative.    Objective:     Vital Signs (Most Recent):  Temp: 97.7 °F (36.5 °C) (12/24/23 0800)  Pulse: 67 (12/24/23 1000)  Resp: (!) 22 (12/24/23 1000)  BP: (!) 151/70 (12/24/23 1000)  SpO2: (!) 93 % (12/24/23 1000) Vital Signs (24h Range):  Temp:  [97.7 °F (36.5 °C)-98.7 °F (37.1 °C)] 97.7 °F (36.5 °C)  Pulse:  [57-77] 67  Resp:  [13-23] 22  SpO2:  [91 %-99 %] 93 %  BP: (110-179)/() 151/70     Weight: 65.9 kg (145 lb 4.5 oz)  Body mass index is 23.45 kg/m².     Physical Exam  Constitutional:       General: She is not in acute distress.     Appearance: Normal appearance. She is not toxic-appearing.   HENT:      Head: Normocephalic.   Eyes:      General: No visual field deficit.     Extraocular Movements:      Right eye: Normal extraocular motion and no nystagmus.      Left eye: Normal extraocular motion and no nystagmus.      Pupils: Pupils are equal, round, and reactive to light.   Cardiovascular:      Rate and Rhythm: Normal rate.   Pulmonary:      Effort: Pulmonary effort is normal.      Breath sounds: Normal breath sounds.   Musculoskeletal:         General: No swelling.       Left hand: Swelling (left middle finger) present.   Skin:     General: Skin is warm and dry.   Neurological:      General: No focal deficit present.      Mental Status: She is alert and oriented to person, place, and time.      Cranial Nerves: No cranial nerve deficit, dysarthria or facial asymmetry.      Sensory: Sensation is intact. No sensory deficit.      Motor: Motor function is intact. No weakness, tremor, abnormal muscle tone or pronator drift.      Coordination: Romberg sign negative. Coordination normal. Finger-Nose-Finger Test and Heel to Shin Test normal. Rapid alternating movements normal.      Gait: Gait normal.   Psychiatric:         Attention and Perception: Attention normal.         Mood and Affect: Affect normal.         Speech: Speech normal.         Behavior: Behavior normal.          NEUROLOGICAL EXAMINATION:     MENTAL STATUS   Oriented to person, place, and time.   Speech: speech is normal     CRANIAL NERVES     CN III, IV, VI   Pupils are equal, round, and reactive to light.    GAIT AND COORDINATION      Coordination   Finger to nose coordination: normal      Significant Labs:   Recent Lab Results  (Last 5 results in the past 24 hours)        12/24/23  0906   12/23/23 2038 12/23/23 2023 12/23/23 2022 12/23/23 2022        Albumin/Globulin Ratio   1.2             Albumin   4.3             ALP   114             ALT   22             aPTT   32.5  Comment: For Minimal Heparin Infusion, the goal aPTT 64-85 seconds corresponds to an anti-Xa of 0.3-0.5.    For Low Intensity and High Intensity Heparin, the goal aPTT  seconds corresponds to an anti-Xa of 0.3-0.7             AST   18             Baso #   0.06             Basophil %   1.0             BILIRUBIN TOTAL   0.7             BNP   58.1             BUN   15.1             Calcium   9.5             Chloride   107             Cholesterol Total   190             CO2   26             Creatinine   0.97             eGFR   >60              Eos #   0.21             Eosinophil %   3.3             Estimated Avg Glucose 122.6               Globulin, Total   3.5             Glucose   135             HDL   59             Hematocrit   39.5             Hemoglobin   13.1             Hemoglobin A1C External 5.9               Immature Grans (Abs)   0.01             Immature Granulocytes   0.2             INR   1.0             LDL Cholesterol   106.00             Lymph #   2.65             LYMPH %   42.1             MCH   29.7             MCHC   33.2             MCV   89.6             Mono #   0.52             Mono %   8.3             MPV   10.6             Neut #   2.84             Neut %   45.1             nRBC   0.0             Platelet Count   291             POC Anion Gap     18           POC BUN     16           POC Chloride     103           POC Creatinine     1.0           POC Glucose     138           POC Hematocrit     39           POC HEMOGLOBIN     13           POC Ionized Calcium     1.20           Potassium, Blood Gas     3.8           POC PTINR       1.1         POC PTWBT       13.5         Sodium, Blood Gas     142           POC TCO2 (MEASURED)     27           POCT Glucose         124       Potassium   3.9             PROTEIN TOTAL   7.8             Protime   13.2             RBC   4.41             RDW   13.0             Sample     ARTERIAL   VENOUS         Sodium   143             Total Cholesterol/HDL Ratio   3             Triglycerides   124             Troponin I   <0.010             TSH   1.480             Very Low Density Lipoprotein   25             WBC   6.29                                    Significant Imaging: I have reviewed all pertinent imaging results/findings within the past 24 hours.  Assessment and Plan:     Cerebrovascular accident  -presented with left sided weakness, left eye blurry vision   -Stroke RF: hx of stroke, HLD (allergy to statins)  -intervention: TNK at 2115 on 12/23  -Etiology: ESUS (suspicious for cardioembolic  etiology)       Stroke workup  -CT head: negative  -CTA head and neck: negative for LVO or flow limiting stenosis  -MRI brain: negative  -LDL: 106  -TSH: 1.480    Plan  - continue stroke workup  - therapy evals tomorrow  - can likely discharge tomorrow  - will need to follow up with cardiology, needs a 30 day monitor or loop recorder  - MRI brain images reviewed appears to have an old cerebellar infarct, but overall brain looks pristine without small vessel disease   - recommend aspirin 325 mg daily upon discharge (she was taking aspirin 81 mg at home)  - encouraged to return to work AFTER the new year   - Permissive HTN for now, after 24 hours SBP less than 140  - LDL goal less than 70, she does have a statin allergy with angioedema, alternative to be discussed with PCP outpatient            VTE Risk Mitigation (From admission, onward)           Ordered     Reason for No Pharmacological VTE Prophylaxis  Once        Question:  Reasons:  Answer:  Risk of Bleeding    12/24/23 0849     IP VTE HIGH RISK PATIENT  Once         12/24/23 0849     Place sequential compression device  Until discontinued         12/24/23 0849                    Thank you for your consult.  Further recommendations to follow from MD Zhane Hair, NP  Neurology  [facility]

## 2023-12-24 NOTE — ED NOTES
ED Charge Nurse reviewed TNK administration documentation and post TNK assessments and vital signs. Patient cleared to go to ICU at this time.

## 2023-12-24 NOTE — PROGRESS NOTES
Assumed care of patient in MRI from ED nurse Rocio. Modified neuro check for 2315, patient in MRI at this time.

## 2023-12-24 NOTE — SUBJECTIVE & OBJECTIVE
Past Medical History:   Diagnosis Date    History of CVA (cerebrovascular accident)     HLD (hyperlipidemia)        Past Surgical History:   Procedure Laterality Date    BLADDER SUSPENSION      CATARACT EXTRACTION      PARS PLANA VITRECTOMY W/ REPAIR OF MACULAR HOLE      PARTIAL HYSTERECTOMY      SPINE SURGERY      TYMPANOSTOMY TUBE PLACEMENT         Review of patient's allergies indicates:   Allergen Reactions    Gabapentin     Statins-hmg-coa reductase inhibitors      Other reaction(s): Angioedema       Current Neurological Medications:     No current facility-administered medications on file prior to encounter.     Current Outpatient Medications on File Prior to Encounter   Medication Sig    ALPRAZolam (XANAX) 0.25 MG tablet Take 0.25 mg by mouth 2 (two) times daily as needed.    aspirin (ECOTRIN) 81 MG EC tablet Take 81 mg by mouth once daily.    ezetimibe (ZETIA) 10 mg tablet Take 10 mg by mouth once daily.    multivit-mins no.63/iron/folic (M-VIT ORAL) Take by mouth.     Family History       Problem Relation (Age of Onset)    Cancer Father    Vitamin D deficiency Father          Tobacco Use    Smoking status: Never    Smokeless tobacco: Never   Substance and Sexual Activity    Alcohol use: Never    Drug use: Never    Sexual activity: Not on file     Review of Systems   Neurological:  Negative for dizziness, tremors, seizures, syncope, facial asymmetry, speech difficulty, weakness, light-headedness, numbness and headaches.   All other systems reviewed and are negative.    Objective:     Vital Signs (Most Recent):  Temp: 97.7 °F (36.5 °C) (12/24/23 0800)  Pulse: 67 (12/24/23 1000)  Resp: (!) 22 (12/24/23 1000)  BP: (!) 151/70 (12/24/23 1000)  SpO2: (!) 93 % (12/24/23 1000) Vital Signs (24h Range):  Temp:  [97.7 °F (36.5 °C)-98.7 °F (37.1 °C)] 97.7 °F (36.5 °C)  Pulse:  [57-77] 67  Resp:  [13-23] 22  SpO2:  [91 %-99 %] 93 %  BP: (110-179)/() 151/70     Weight: 65.9 kg (145 lb 4.5 oz)  Body mass index is  23.45 kg/m².     Physical Exam  Constitutional:       General: She is not in acute distress.     Appearance: Normal appearance. She is not toxic-appearing.   HENT:      Head: Normocephalic.   Eyes:      General: No visual field deficit.     Extraocular Movements:      Right eye: Normal extraocular motion and no nystagmus.      Left eye: Normal extraocular motion and no nystagmus.      Pupils: Pupils are equal, round, and reactive to light.   Cardiovascular:      Rate and Rhythm: Normal rate.   Pulmonary:      Effort: Pulmonary effort is normal.      Breath sounds: Normal breath sounds.   Musculoskeletal:         General: No swelling.      Left hand: Swelling (left middle finger) present.   Skin:     General: Skin is warm and dry.   Neurological:      General: No focal deficit present.      Mental Status: She is alert and oriented to person, place, and time.      Cranial Nerves: No cranial nerve deficit, dysarthria or facial asymmetry.      Sensory: Sensation is intact. No sensory deficit.      Motor: Motor function is intact. No weakness, tremor, abnormal muscle tone or pronator drift.      Coordination: Romberg sign negative. Coordination normal. Finger-Nose-Finger Test and Heel to Shin Test normal. Rapid alternating movements normal.      Gait: Gait normal.   Psychiatric:         Attention and Perception: Attention normal.         Mood and Affect: Affect normal.         Speech: Speech normal.         Behavior: Behavior normal.          NEUROLOGICAL EXAMINATION:     MENTAL STATUS   Oriented to person, place, and time.   Speech: speech is normal     CRANIAL NERVES     CN III, IV, VI   Pupils are equal, round, and reactive to light.    GAIT AND COORDINATION      Coordination   Finger to nose coordination: normal      Significant Labs:   Recent Lab Results  (Last 5 results in the past 24 hours)        12/24/23  0906   12/23/23 2038 12/23/23 2023 12/23/23 2022 12/23/23 2022        Albumin/Globulin Ratio    1.2             Albumin   4.3             ALP   114             ALT   22             aPTT   32.5  Comment: For Minimal Heparin Infusion, the goal aPTT 64-85 seconds corresponds to an anti-Xa of 0.3-0.5.    For Low Intensity and High Intensity Heparin, the goal aPTT  seconds corresponds to an anti-Xa of 0.3-0.7             AST   18             Baso #   0.06             Basophil %   1.0             BILIRUBIN TOTAL   0.7             BNP   58.1             BUN   15.1             Calcium   9.5             Chloride   107             Cholesterol Total   190             CO2   26             Creatinine   0.97             eGFR   >60             Eos #   0.21             Eosinophil %   3.3             Estimated Avg Glucose 122.6               Globulin, Total   3.5             Glucose   135             HDL   59             Hematocrit   39.5             Hemoglobin   13.1             Hemoglobin A1C External 5.9               Immature Grans (Abs)   0.01             Immature Granulocytes   0.2             INR   1.0             LDL Cholesterol   106.00             Lymph #   2.65             LYMPH %   42.1             MCH   29.7             MCHC   33.2             MCV   89.6             Mono #   0.52             Mono %   8.3             MPV   10.6             Neut #   2.84             Neut %   45.1             nRBC   0.0             Platelet Count   291             POC Anion Gap     18           POC BUN     16           POC Chloride     103           POC Creatinine     1.0           POC Glucose     138           POC Hematocrit     39           POC HEMOGLOBIN     13           POC Ionized Calcium     1.20           Potassium, Blood Gas     3.8           POC PTINR       1.1         POC PTWBT       13.5         Sodium, Blood Gas     142           POC TCO2 (MEASURED)     27           POCT Glucose         124       Potassium   3.9             PROTEIN TOTAL   7.8             Protime   13.2             RBC   4.41             RDW    13.0             Sample     ARTERIAL   VENOUS         Sodium   143             Total Cholesterol/HDL Ratio   3             Triglycerides   124             Troponin I   <0.010             TSH   1.480             Very Low Density Lipoprotein   25             WBC   6.29                                    Significant Imaging: I have reviewed all pertinent imaging results/findings within the past 24 hours.

## 2023-12-24 NOTE — NURSING
Nurses Note -- 4 Eyes      12/24/2023   1:04 AM      Skin assessed during: Admit      [x] No Altered Skin Integrity Present    [x]Prevention Measures Documented      [] Yes- Altered Skin Integrity Present or Discovered   [] LDA Added if Not in Epic (Describe Wound)   [] New Altered Skin Integrity was Present on Admit and Documented in LDA   [] Wound Image Taken    Wound Care Consulted? No    Attending Nurse:  Lorraine Keen RN    Second RN/Staff Member:  Valerie Lester RN

## 2023-12-25 VITALS
BODY MASS INDEX: 23.35 KG/M2 | TEMPERATURE: 98 F | WEIGHT: 145.31 LBS | SYSTOLIC BLOOD PRESSURE: 112 MMHG | RESPIRATION RATE: 16 BRPM | HEART RATE: 60 BPM | OXYGEN SATURATION: 93 % | DIASTOLIC BLOOD PRESSURE: 51 MMHG | HEIGHT: 66 IN

## 2023-12-25 PROCEDURE — 97165 OT EVAL LOW COMPLEX 30 MIN: CPT

## 2023-12-25 PROCEDURE — 99233 SBSQ HOSP IP/OBS HIGH 50: CPT | Mod: FS,,, | Performed by: PSYCHIATRY & NEUROLOGY

## 2023-12-25 PROCEDURE — 99233 PR SUBSEQUENT HOSPITAL CARE,LEVL III: ICD-10-PCS | Mod: FS,,, | Performed by: PSYCHIATRY & NEUROLOGY

## 2023-12-25 PROCEDURE — 25000003 PHARM REV CODE 250: Performed by: STUDENT IN AN ORGANIZED HEALTH CARE EDUCATION/TRAINING PROGRAM

## 2023-12-25 PROCEDURE — 97161 PT EVAL LOW COMPLEX 20 MIN: CPT

## 2023-12-25 RX ORDER — ASPIRIN 325 MG
325 TABLET ORAL DAILY
Status: SHIPPED | OUTPATIENT
Start: 2023-12-25

## 2023-12-25 RX ORDER — NAPROXEN SODIUM 220 MG/1
325 TABLET, FILM COATED ORAL DAILY
Status: COMPLETED | OUTPATIENT
Start: 2023-12-25 | End: 2023-12-25

## 2023-12-25 RX ORDER — NAPROXEN SODIUM 220 MG/1
325 TABLET, FILM COATED ORAL DAILY
Status: DISCONTINUED | OUTPATIENT
Start: 2023-12-25 | End: 2023-12-25 | Stop reason: HOSPADM

## 2023-12-25 RX ORDER — NAPROXEN SODIUM 220 MG/1
TABLET, FILM COATED ORAL
Qty: 120 TABLET | Refills: 0
Start: 2023-12-25

## 2023-12-25 RX ADMIN — ASPIRIN 81 MG 324 MG: 81 TABLET ORAL at 09:12

## 2023-12-25 NOTE — PT/OT/SLP EVAL
Physical Therapy Evaluation and Discharge Note    Patient Name:  Hilda Ordonez   MRN:  [id number]    Recommendations:     Discharge therapy intensity: No Therapy Indicated   Discharge Equipment Recommendations: none   Barriers to discharge: None    Assessment:     Hilda Ordonez is a 73 y.o. female admitted with a medical diagnosis of L sided weakness s/p TNK.  At this time, patient is functioning at their prior level of function and does not require further acute PT services.     Recent Surgery: * No surgery found *      Plan:     During this hospitalization, patient does not require further acute PT services.  Please re-consult if situation changes.      Subjective     Chief Complaint: ready to go home  Patient/Family Comments/goals: to go home  Pain/Comfort:  Pain Rating 1: 0/10    Patients cultural, spiritual, Muslim conflicts given the current situation: no    Living Environment:  Lives with 2 sons, no steps to enter home.   Prior to admission, patients level of function was independent. No hx of falls.  Equipment used at home: none.  DME owned (not currently used): rolling walker, single point cane, and shower chair.  Upon discharge, patient will have assistance from sons.    Objective:     Communicated with nurse prior to session.  Patient found supine with telemetry, pulse ox (continuous), blood pressure cuff upon PT entry to room.    General Precautions: Standard, other (see comments) (BP<140/90)    Orthopedic Precautions:    Braces: N/A  Respiratory Status: Room air  Blood Pressure: 112/51, 97%, 73 HR    Exams:  Cognitive Exam:  Patient is oriented to Person, Place, Time, and Situation  Gross Motor Coordination:  WFL  RLE ROM: WNL  RLE Strength: WNL  LLE ROM: WNL  LLE Strength: WNL    Functional Mobility:  Bed Mobility:     Supine to Sit: independence  Sit to Supine: independence  Transfers:     Sit to Stand:  independence with no AD  Gait: 120ft with no AD and independently.   Balance: able to  perform dynamic turns, head turns while walking, and picking up object from ground with no LOB.     AM-PAC 6 CLICK MOBILITY  Total Score:24       Treatment and Education:    Patient provided with verbal education education regarding fall prevention, safety awareness, and discharge/DME recommendations.  Understanding was verbalized.     Patient left supine with all lines intact and call button in reach.    GOALS:   Multidisciplinary Problems       Physical Therapy Goals       Not on file                    History:     Past Medical History:   Diagnosis Date    History of CVA (cerebrovascular accident)     HLD (hyperlipidemia)        Past Surgical History:   Procedure Laterality Date    BLADDER SUSPENSION      CATARACT EXTRACTION      PARS PLANA VITRECTOMY W/ REPAIR OF MACULAR HOLE      PARTIAL HYSTERECTOMY      SPINE SURGERY      TYMPANOSTOMY TUBE PLACEMENT         Time Tracking:     PT Received On: 12/25/23  PT Start Time: 0808     PT Stop Time: 0820  PT Total Time (min): 12 min     Billable Minutes: Evaluation 12      12/25/2023

## 2023-12-25 NOTE — SUBJECTIVE & OBJECTIVE
Subjective:     Interval History: No new issues reported. Patient lying in bed in NAD. MRI negative. Repeat CT head appears negative, official read pending. Patient ambulated in wolf way with PT without difficulties.     Current Neurological Medications:     Current Facility-Administered Medications   Medication Dose Route Frequency Provider Last Rate Last Admin    0.9%  NaCl infusion   Intravenous Continuous Zhane Hair  mL/hr at 12/24/23 1808 Rate Verify at 12/24/23 1808    aspirin chewable tablet 324 mg  324 mg Oral Daily Nolvia Ramirez,         ondansetron injection 4 mg  4 mg Intravenous Q8H PRN Zhane Hair NP           Review of Systems  Objective:     Vital Signs (Most Recent):  Temp: 97.7 °F (36.5 °C) (12/25/23 0800)  Pulse: 60 (12/25/23 0800)  Resp: 16 (12/25/23 0800)  BP: (!) 112/51 (12/25/23 0800)  SpO2: (!) 93 % (12/25/23 0800) Vital Signs (24h Range):  Temp:  [97.6 °F (36.4 °C)-98.7 °F (37.1 °C)] 97.7 °F (36.5 °C)  Pulse:  [55-72] 60  Resp:  [13-23] 16  SpO2:  [91 %-96 %] 93 %  BP: ()/(43-90) 112/51     Weight: 65.9 kg (145 lb 4.5 oz)  Body mass index is 23.45 kg/m².     Physical Exam  Vitals reviewed.   Constitutional:       Appearance: Normal appearance.   HENT:      Head: Normocephalic and atraumatic.      Nose: Nose normal.      Mouth/Throat:      Mouth: Mucous membranes are moist.   Eyes:      Extraocular Movements: Extraocular movements intact.      Pupils: Pupils are equal, round, and reactive to light.   Pulmonary:      Effort: Pulmonary effort is normal.   Abdominal:      Palpations: Abdomen is soft.   Musculoskeletal:         General: Normal range of motion.      Cervical back: Normal range of motion and neck supple.   Skin:     General: Skin is warm and dry.      Capillary Refill: Capillary refill takes less than 2 seconds.   Neurological:      General: No focal deficit present.      Mental Status: She is alert and oriented to person, place, and time.  "Physical Therapy Treatment    Patient Name:  Agus Horan   MRN:  [id number]    Recommendations:     Discharge Recommendations:  LTACH (long-term acute care hospital)   Discharge Equipment Recommendations: walker, rolling, bedside commode   Barriers to discharge: None    Assessment:     Agus Horan is a 23 y.o. male admitted with a medical diagnosis of Acute renal failure with tubular necrosis.  He presents with the following impairments/functional limitations:  weakness, impaired endurance, impaired functional mobilty, gait instability, impaired balance, decreased lower extremity function, pain, decreased safety awareness, impaired skin, edema, orthopedic precautions. Patient is agreeable to participation with PT treatment. He reports 4/10 RLE pain at rest and was premedicated for activity. He requires SBA for supine <> sit <> stand with RW maintaining RLE NWB. He requests to use restroom for BM and ambulated 10' with RW, CGA, RLE NWB, and 1 LOB with posterior ambulation to toilet with patient able to recover. He performs hygiene independently post-BM. He then ambulated 70' x2 with RW, CGA, RLE NWB, and 1 seated rest break. He declines sitting up in chair upon return to room and returned to bed with RLE elevated, bed alarm on, and RN notified.     Rehab Prognosis: Good; patient would benefit from acute skilled PT services to address these deficits and reach maximum level of function.    Recent Surgery: Procedure(s) (LRB):  REPLACEMENT, WOUND VAC (Right)  DEBRIDEMENT, LOWER EXTREMITY (Right) 2 Days Post-Op    Plan:     During this hospitalization, patient to be seen daily to address the identified rehab impairments via gait training, therapeutic activities, therapeutic exercises and progress toward the following goals:    · Plan of Care Expires:  05/15/22    Subjective     Chief Complaint: needs to have BM  Patient/Family Comments/goals: "I did 200 leg lifts this morning because I don't want to go to the LTAC" "   Psychiatric:         Mood and Affect: Mood normal.         Behavior: Behavior normal.         Thought Content: Thought content normal.         Judgment: Judgment normal.          NEUROLOGICAL EXAMINATION:     MENTAL STATUS   Oriented to person, place, and time.     CRANIAL NERVES     CN III, IV, VI   Pupils are equal, round, and reactive to light.      Significant Labs: CBC:   Recent Labs   Lab 12/23/23 2023 12/23/23 2038   WBC  --  6.29   HGB  --  13.1   HCT 39 39.5   PLT  --  291     CMP:   Recent Labs   Lab 12/23/23 2038      K 3.9   CO2 26   BUN 15.1   CREATININE 0.97   CALCIUM 9.5   ALBUMIN 4.3   BILITOT 0.7   ALKPHOS 114   AST 18   ALT 22       Significant Imaging: I have reviewed all pertinent imaging results/findings within the past 24 hours.   - PT provided education that goals of LTAC are more for wound care and IV antibiotics versus PT   Pain/Comfort:  · Pain Rating 1: 4/10  · Location - Side 1: Right  · Location - Orientation 1: lower  · Location 1: leg  · Pain Addressed 1: Pre-medicate for activity, Reposition, Distraction      Objective:     Communicated with HAMILTON Scott prior to session.  Patient found HOB elevated with peripheral IV, telemetry, wound vac upon PT entry to room.     General Precautions: Standard, fall   Orthopedic Precautions:RLE non weight bearing   Braces: N/A  Respiratory Status: Room air     Functional Mobility:  · Bed Mobility:     · Supine to Sit: stand by assistance  · Transfers:     · Sit to Stand:  stand by assistance with rolling walker  · Toilet Transfer: contact guard assistance with  rolling walker  using  Step Transfer  · Gait: 70' x2 with RW, CGA, RLE NWB, and 1 seated rest break      AM-PAC 6 CLICK MOBILITY  Turning over in bed (including adjusting bedclothes, sheets and blankets)?: 4  Sitting down on and standing up from a chair with arms (e.g., wheelchair, bedside commode, etc.): 4  Moving from lying on back to sitting on the side of the bed?: 4  Moving to and from a bed to a chair (including a wheelchair)?: 3  Need to walk in hospital room?: 3  Climbing 3-5 steps with a railing?: 2  Basic Mobility Total Score: 20       Therapeutic Activities and Exercises:   Patient was educated on the importance of OOB activity and functional mobility to negate negative effects of prolonged bed rest during hospitalization, safe transfers and ambulation, and D/C planning     Patient requires SBA-CGA for toilet transfer and VC for sequencing. He performs hygiene independently     Patient left HOB elevated with all lines intact, call button in reach, bed alarm on and RN notified..    GOALS:   Multidisciplinary Problems     Physical Therapy Goals        Problem: Physical Therapy    Goal Priority Disciplines Outcome Goal Variances  Interventions   Physical Therapy Goal     PT, PT/OT Ongoing, Progressing     Description: Goals to be met by: 5/15/2022     Patient will increase functional independence with mobility by performin). Supine to sit with Modified Dent  2). Sit to stand transfer with Modified Dent maintaining RLE NWB using axillary crutches or rolling walker  3). Bed to chair transfer with Modified Dent maintaining RLE NWB using axillary crutches or rolling walker  5). Gait  x 150 feet with Modified Dent maintaining RLE NWB using axillary crutches or rolling walker                     Time Tracking:     PT Received On: 22  PT Start Time: 1002     PT Stop Time: 1035  PT Total Time (min): 33 min     Billable Minutes: Gait Training 20 and Therapeutic Activity 13    Treatment Type: Treatment  PT/PTA: PT     PTA Visit Number: 0     2022

## 2023-12-25 NOTE — ASSESSMENT & PLAN NOTE
-presented with left sided weakness, left eye blurry vision   -Stroke RF: hx of stroke, HLD (allergy to statins)  -intervention: TNK at 2115 on 12/23  -Etiology: ESUS (suspicious for cardioembolic etiology)       Stroke workup  -CT head: negative  -CTA head and neck: negative for LVO or flow limiting stenosis  -MRI brain: negative  -LDL: 106  -TSH: 1.480    Plan  -MRI negative for acute infarct, CT head appears negative, official read pending.   - Patient to follow up [facility], Dr. Bragg with 2 week event monitor upon discharge to assess for any arrhythmias/atrial fibrillation   - MRI brain images reviewed appears to have an old cerebellar infarct, but overall brain looks pristine without small vessel disease   - recommend aspirin 325 mg daily upon discharge (she was taking aspirin 81 mg at home)  - encouraged to return to work AFTER the new year   -ok to discharge home today.  -follow up with Dr. Banks in clinic in 2-4 weeks.

## 2023-12-25 NOTE — PROGRESS NOTES
"[facility] - [facility]  Neurology  Progress Note    Patient Name: Hilda Ordonez  MRN: [id number]  Admission Date: 12/23/2023  Hospital Length of Stay: 2 days  Code Status: Full Code   Attending Provider: JAIME Donahue MD  Primary Care Physician: Valentin Diop MD   Principal Problem:<principal problem not specified>    HPI:   73 year old female with a past medical history of stroke (2015, no residual deficits), hx of PE (2015), and HLD presented to ED on 12/23 for LUE coordination problems and visual changes in the left eye. She reported she had a normal day yesterday, she went to work and got off of work around 4:00 pm. She went to the grocery store on the way home. She got home and started cooking around 5:00 pm. She reported around 6:15 pm she went to sit down in her chair in the living room and suddenly her left arm felt weightless. She also felt like her LUE was not coordinated and it was just "flopping around".  She walked back to the kitchen to rinse a plate but had a difficult time turning the faucet on with her left hand, it took her several attempts. She went to the restroom to look at her face in the mirror, she did not have any facial droop. When she picked up her right and left arm, she reports her right arm went up straight, no problems but her left arm she was uncoordinated with and it seemed like it was going higher than her right arm. She later developed blurred vision, HA, and neck pain. She called her son to bring her to the hospital because she knew there was a limited amount of time she had to get to the hospital. Upon arrival to ED, /91. CT head was negative. CTA head and neck negative for LVO. She was given TNK at 2115. Neurology was consulted for stroke workup.       Of note, she has seen Dr. Griffith outpatient for her previous stroke and a myasthenia workup which was negative (7/2022). In 2015, she was in [place] visiting family and had chest pain, she had a workup " that was negative and she was discharged home. She came home but had persistent chest pain so she came to [facility] where she was found to have a pulmonary embolism. She was put on anticoagulation, workup was otherwise unremarkable. She was discharged on Eliquis. On December 3 2015, she developed HA, LUE ataxia and LSW. MRI brain showed possible infarct in the right centrum semiovale. She had a hypercoagulable workup at that time with elevated homocystine. She was sent to hematology for further evaluation and placed on folic acid supplementation. She was on aspirin and Eliquis until 2 years ago, she reports her PCP did not want her on Eliquis but her cardiologist wanted her on Eliquis. She stopped taking Eliquis about 2 years ago and has remained on aspirin 81 mg daily. She did report prior to the CP and visit to family in [place], she went to [place]. She sat on her flight to [place] for a prolonged period of time without getting up to go to the restroom or walk around because she did not want to disturb the people who were sitting next to her.       Overview/Hospital Course:  No notes on file        Subjective:     Interval History: No new issues reported. Patient lying in bed in NAD. MRI negative. Repeat CT head appears negative, official read pending. Patient ambulated in wolf way with PT without difficulties.     Current Neurological Medications:     Current Facility-Administered Medications   Medication Dose Route Frequency Provider Last Rate Last Admin    0.9%  NaCl infusion   Intravenous Continuous Zhane Hair  mL/hr at 12/24/23 1808 Rate Verify at 12/24/23 1808    aspirin chewable tablet 324 mg  324 mg Oral Daily Nolvia Ramirez, DO        ondansetron injection 4 mg  4 mg Intravenous Q8H PRN Zhane Hair NP           Review of Systems  Objective:     Vital Signs (Most Recent):  Temp: 97.7 °F (36.5 °C) (12/25/23 0800)  Pulse: 60 (12/25/23 0800)  Resp: 16 (12/25/23 0800)  BP: (!) 112/51 (12/25/23  0800)  SpO2: (!) 93 % (12/25/23 0800) Vital Signs (24h Range):  Temp:  [97.6 °F (36.4 °C)-98.7 °F (37.1 °C)] 97.7 °F (36.5 °C)  Pulse:  [55-72] 60  Resp:  [13-23] 16  SpO2:  [91 %-96 %] 93 %  BP: ()/(43-90) 112/51     Weight: 65.9 kg (145 lb 4.5 oz)  Body mass index is 23.45 kg/m².     Physical Exam  Vitals reviewed.   Constitutional:       Appearance: Normal appearance.   HENT:      Head: Normocephalic and atraumatic.      Nose: Nose normal.      Mouth/Throat:      Mouth: Mucous membranes are moist.   Eyes:      Extraocular Movements: Extraocular movements intact.      Pupils: Pupils are equal, round, and reactive to light.   Pulmonary:      Effort: Pulmonary effort is normal.   Abdominal:      Palpations: Abdomen is soft.   Musculoskeletal:         General: Normal range of motion.      Cervical back: Normal range of motion and neck supple.   Skin:     General: Skin is warm and dry.      Capillary Refill: Capillary refill takes less than 2 seconds.   Neurological:      General: No focal deficit present.      Mental Status: She is alert and oriented to person, place, and time.   Psychiatric:         Mood and Affect: Mood normal.         Behavior: Behavior normal.         Thought Content: Thought content normal.         Judgment: Judgment normal.          NEUROLOGICAL EXAMINATION:     MENTAL STATUS   Oriented to person, place, and time.     CRANIAL NERVES     CN III, IV, VI   Pupils are equal, round, and reactive to light.      Significant Labs: CBC:   Recent Labs   Lab 12/23/23 2023 12/23/23 2038   WBC  --  6.29   HGB  --  13.1   HCT 39 39.5   PLT  --  291     CMP:   Recent Labs   Lab 12/23/23 2038      K 3.9   CO2 26   BUN 15.1   CREATININE 0.97   CALCIUM 9.5   ALBUMIN 4.3   BILITOT 0.7   ALKPHOS 114   AST 18   ALT 22       Significant Imaging: I have reviewed all pertinent imaging results/findings within the past 24 hours.  Assessment and Plan:     Cerebrovascular accident  -presented with left  sided weakness, left eye blurry vision   -Stroke RF: hx of stroke, HLD (allergy to statins)  -intervention: TNK at 2115 on 12/23  -Etiology: ESUS (suspicious for cardioembolic etiology)       Stroke workup  -CT head: negative  -CTA head and neck: negative for LVO or flow limiting stenosis  -MRI brain: negative  -LDL: 106  -TSH: 1.480    Plan  -MRI negative for acute infarct, CT head appears negative, official read pending.   - Patient to follow up [facility], Dr. Bragg with 2 week event monitor upon discharge to assess for any arrhythmias/atrial fibrillation   - MRI brain images reviewed appears to have an old cerebellar infarct, but overall brain looks pristine without small vessel disease   - recommend aspirin 325 mg daily upon discharge (she was taking aspirin 81 mg at home)  - encouraged to return to work AFTER the new year   -ok to discharge home today.  -follow up with Dr. Banks in clinic in 2-4 weeks.          VTE Risk Mitigation (From admission, onward)           Ordered     Reason for No Pharmacological VTE Prophylaxis  Once        Question:  Reasons:  Answer:  Risk of Bleeding    12/24/23 0849     IP VTE HIGH RISK PATIENT  Once         12/24/23 0849     Place sequential compression device  Until discontinued         12/24/23 0849                    Elsi Galan NP  Neurology  [facility] - [address] [place] [address]

## 2023-12-25 NOTE — PROGRESS NOTES
[address]    Cardiology  Consult Note    Patient Name: Hilda Ordonez  MRN: [id number]  Admission Date: 12/23/2023  Hospital Length of Stay: 2 days  Code Status: Full Code   Attending Provider: JAIME Donahue MD   Consulting Provider: ROSARIO Rangel  Primary Care Physician: Valentin Diop MD  Principal Problem:<principal problem not specified>    Patient information was obtained from patient and ER records.     Subjective:     Chief Complaint:  CVA     HPI: This is a 73 year old female, previously known to [facility], Dr. Bragg, with hx HLD, carotid artery disease,  hx CVA (2015), and PE (previously on Eliquis).  Last seen in clinic in Nov. 2020. Patient presented to [facility] ED on 12.23.23 with complaints of left upper extremity ataxia, facial droop, left-sided blurred vision. CT of head  and MRI -negative.  CTA head and neck unremarkable.  Blood pressure on admission 179/91.  Labs are remarkable.  Patient received TNK and admitted to ICU. [facility] is consulted for CVA      PMH: HLD, carotid artery disease,  hx CVA, and PE.  PSH:  Breast implants; hysterectomy.  Family History: Father- leukemia  Social History:     12.25.23: NAD.     Previous Cardiac Diagnostics:   ECHO 12.24.23    TDS due to breast implants.    There was agitated saline (bubble) used. Study is negative for shunt.    Left Ventricle: The left ventricle is normal in size. Normal wall thickness. Normal wall motion. There is normal systolic function with a visually estimated ejection fraction of 60 - 65%. There is normal diastolic function.    Right Ventricle: Normal right ventricular cavity size. Systolic function is normal.    Aortic Valve: The aortic valve is a trileaflet valve.    Tricuspid Valve: There is mild regurgitation.    IVC/SVC: Normal venous pressure at 3 mmHg.    No significant valvular abnormalities noted in this study.    ECHO 11.18.20  1. The study quality is average.   2. The left ventricle is normal  in size. Global left ventricular systolic function is normal. The left ventricular ejection fraction is 60%. The left ventricle diastolic function is impaired (Grade I) with normal left atrial pressure.   3. Diffuse thickening of the aortic valve cusps is noted with normal cuspal excursion  4.  Mild (1+) aortic regurgitation. Mild (1+) mitral regurgitation. Mild (1+) tricuspid regurgitation. Trace pulmonic regurgitation.   5. The pulmonary artery systolic pressure is 25 mmHg.     Carotid US 11.18.20  1. The study quality is good.   2. 1-39% stenosis in the proximal right internal carotid artery based on Bluth Criteria.   3. 40-59% stenosis in the distal left internal carotid artery based on Bluth Criteria. Distal portion is tortuous and appears ~40% stenosis by 2D.   4. Antegrade right vertebral artery flow.   5. Antegrade left vertebral artery flow.      PET 11.11.20   This is a normal perfusion study, no perfusion defects noted. There is no evidence of ischemia.    This scan is suggestive of low risk for future cardiovascular events.    The left ventricular cavity is noted to be normal on the stress studies. The stress left ventricular ejection fraction was calculated to be 72% and left ventricular global function is normal. The rest left ventricular cavity is noted to be normal. The rest left ventricular ejection fraction was calculated to be 58% and rest left ventricular global function is normal.    When compared to the resting ejection fraction (58%), the stress ejection fraction (72%) has increased.    The study quality is excellent.           Past Medical History:   Diagnosis Date    History of CVA (cerebrovascular accident)     HLD (hyperlipidemia)        Past Surgical History:   Procedure Laterality Date    BLADDER SUSPENSION      CATARACT EXTRACTION      PARS PLANA VITRECTOMY W/ REPAIR OF MACULAR HOLE      PARTIAL HYSTERECTOMY      SPINE SURGERY      TYMPANOSTOMY TUBE PLACEMENT         Review of patient's  allergies indicates:   Allergen Reactions    Gabapentin     Statins-hmg-coa reductase inhibitors      Other reaction(s): Angioedema       No current facility-administered medications on file prior to encounter.     Current Outpatient Medications on File Prior to Encounter   Medication Sig    ALPRAZolam (XANAX) 0.25 MG tablet Take 0.25 mg by mouth 2 (two) times daily as needed.    aspirin (ECOTRIN) 81 MG EC tablet Take 81 mg by mouth once daily.    ezetimibe (ZETIA) 10 mg tablet Take 10 mg by mouth once daily.    multivit-mins no.63/iron/folic (M-VIT ORAL) Take by mouth.     Family History       Problem Relation (Age of Onset)    Cancer Father    Vitamin D deficiency Father          Tobacco Use    Smoking status: Never    Smokeless tobacco: Never   Substance and Sexual Activity    Alcohol use: Never    Drug use: Never    Sexual activity: Not on file       Review of Systems   Respiratory:  Negative for chest tightness and shortness of breath.    Neurological:  Positive for facial asymmetry.        Vision changes and LUE ataxia        Objective:     Vital Signs (Most Recent):  Temp: 98 °F (36.7 °C) (12/25/23 0400)  Pulse: (!) 58 (12/25/23 0600)  Resp: 17 (12/25/23 0600)  BP: 122/71 (12/25/23 0600)  SpO2: (!) 94 % (12/25/23 0600) Vital Signs (24h Range):  Temp:  [97.6 °F (36.4 °C)-98.7 °F (37.1 °C)] 98 °F (36.7 °C)  Pulse:  [55-72] 58  Resp:  [13-23] 17  SpO2:  [91 %-97 %] 94 %  BP: ()/(43-90) 122/71     Weight: 65.9 kg (145 lb 4.5 oz)  Body mass index is 23.45 kg/m².    SpO2: (!) 94 %         Intake/Output Summary (Last 24 hours) at 12/25/2023 0632  Last data filed at 12/24/2023 1808  Gross per 24 hour   Intake 876.96 ml   Output --   Net 876.96 ml         Lines/Drains/Airways       Drain  Duration             Female External Urinary Catheter w/ Suction -- days              Peripheral Intravenous Line  Duration                  Peripheral IV - Single Lumen 12/23/23 2020 20 G Anterior;Distal;Right Forearm 1 day          Peripheral IV - Single Lumen 12/23/23 2114 20 G Anterior;Left Forearm 1 day                    Significant Labs:  Recent Results (from the past 72 hour(s))   POCT glucose    Collection Time: 12/23/23  8:22 PM   Result Value Ref Range    POCT Glucose 124 (H) 70 - 110 mg/dL   ISTAT PROCEDURE    Collection Time: 12/23/23  8:22 PM   Result Value Ref Range    POC PTWBT 13.5 9.7 - 14.3 sec    POC PTINR 1.1 0.9 - 1.2    Sample VENOUS    ISTAT CHEM8    Collection Time: 12/23/23  8:23 PM   Result Value Ref Range    POC Glucose 138 (H) 70 - 110 mg/dL    POC BUN 16 6 - 30 mg/dL    POC Creatinine 1.0 0.5 - 1.4 mg/dL    POC Sodium 142 136 - 145 mmol/L    POC Potassium 3.8 3.5 - 5.1 mmol/L    POC Chloride 103 95 - 110 mmol/L    POC TCO2 (MEASURED) 27 23 - 27 mmol/L    POC Anion Gap 18 (H) 8 - 16 mmol/L    POC Ionized Calcium 1.20 1.06 - 1.42 mmol/L    POC Hematocrit 39 36 - 54 %PCV    POC HEMOGLOBIN 13 g/dL    Sample ARTERIAL    Comprehensive metabolic panel    Collection Time: 12/23/23  8:38 PM   Result Value Ref Range    Sodium Level 143 136 - 145 mmol/L    Potassium Level 3.9 3.5 - 5.1 mmol/L    Chloride 107 98 - 107 mmol/L    Carbon Dioxide 26 23 - 31 mmol/L    Glucose Level 135 (H) 82 - 115 mg/dL    Blood Urea Nitrogen 15.1 9.8 - 20.1 mg/dL    Creatinine 0.97 0.55 - 1.02 mg/dL    Calcium Level Total 9.5 8.4 - 10.2 mg/dL    Protein Total 7.8 (H) 5.8 - 7.6 gm/dL    Albumin Level 4.3 3.4 - 4.8 g/dL    Globulin 3.5 2.4 - 3.5 gm/dL    Albumin/Globulin Ratio 1.2 1.1 - 2.0 ratio    Bilirubin Total 0.7 <=1.5 mg/dL    Alkaline Phosphatase 114 40 - 150 unit/L    Alanine Aminotransferase 22 0 - 55 unit/L    Aspartate Aminotransferase 18 5 - 34 unit/L    eGFR >60 mls/min/1.73/m2   Protime-INR    Collection Time: 12/23/23  8:38 PM   Result Value Ref Range    PT 13.2 12.5 - 14.5 seconds    INR 1.0 <=1.3   TSH    Collection Time: 12/23/23  8:38 PM   Result Value Ref Range    TSH 1.480 0.350 - 4.940 uIU/mL   LDL - Lipid Panel     Collection Time: 12/23/23  8:38 PM   Result Value Ref Range    Cholesterol Total 190 <=200 mg/dL    HDL Cholesterol 59 35 - 60 mg/dL    Triglyceride 124 37 - 140 mg/dL    Cholesterol/HDL Ratio 3 0 - 5    Very Low Density Lipoprotein 25     LDL Cholesterol 106.00 50.00 - 140.00 mg/dL   APTT    Collection Time: 12/23/23  8:38 PM   Result Value Ref Range    PTT 32.5 23.2 - 33.7 seconds   CBC with Differential    Collection Time: 12/23/23  8:38 PM   Result Value Ref Range    WBC 6.29 4.50 - 11.50 x10(3)/mcL    RBC 4.41 4.20 - 5.40 x10(6)/mcL    Hgb 13.1 12.0 - 16.0 g/dL    Hct 39.5 37.0 - 47.0 %    MCV 89.6 80.0 - 94.0 fL    MCH 29.7 27.0 - 31.0 pg    MCHC 33.2 33.0 - 36.0 g/dL    RDW 13.0 11.5 - 17.0 %    Platelet 291 130 - 400 x10(3)/mcL    MPV 10.6 (H) 7.4 - 10.4 fL    Neut % 45.1 %    Lymph % 42.1 %    Mono % 8.3 %    Eos % 3.3 %    Basophil % 1.0 %    Lymph # 2.65 0.6 - 4.6 x10(3)/mcL    Neut # 2.84 2.1 - 9.2 x10(3)/mcL    Mono # 0.52 0.1 - 1.3 x10(3)/mcL    Eos # 0.21 0 - 0.9 x10(3)/mcL    Baso # 0.06 <=0.2 x10(3)/mcL    IG# 0.01 0 - 0.04 x10(3)/mcL    IG% 0.2 %    NRBC% 0.0 %   Troponin I    Collection Time: 12/23/23  8:38 PM   Result Value Ref Range    Troponin-I <0.010 0.000 - 0.045 ng/mL   Brain natriuretic peptide    Collection Time: 12/23/23  8:38 PM   Result Value Ref Range    Natriuretic Peptide 58.1 <=100.0 pg/mL   Hemoglobin A1C    Collection Time: 12/24/23  9:06 AM   Result Value Ref Range    Hemoglobin A1c 5.9 <=7.0 %    Estimated Average Glucose 122.6 mg/dL   Echo    Collection Time: 12/24/23  2:31 PM   Result Value Ref Range    BSA 1.75 m2    Cain's Biplane MOD Ejection Fraction 66 %    LVOT stroke volume 48.58 cm3    LVIDd 3.68 3.5 - 6.0 cm    LV Systolic Volume 23.70 mL    LV Systolic Volume Index 13.5 mL/m2    LVIDs 2.56 2.1 - 4.0 cm    LV Diastolic Volume 57.40 mL    LV Diastolic Volume Index 32.80 mL/m2    IVS 1.15 (A) 0.6 - 1.1 cm    LVOT diameter 1.80 cm    LVOT area 2.5 cm2    FS 30 28 -  44 %    Left Ventricle Relative Wall Thickness 0.51 cm    Posterior Wall 0.93 0.6 - 1.1 cm    LV mass 118.13 g    LV Mass Index 68 g/m2    MV Peak E Mann 0.76 m/s    TDI LATERAL 0.12 m/s    TDI SEPTAL 0.08 m/s    E/E' ratio 7.60 m/s    MV Peak A Mann 0.69 m/s    TR Max Mann 2.4 m/s    E/A ratio 1.10     E wave deceleration time 238.00 msec    LV SEPTAL E/E' RATIO 9.50 m/s    LV LATERAL E/E' RATIO 6.33 m/s    LVOT peak mann 0.82 m/s    Left Ventricular Outflow Tract Mean Velocity 0.55 cm/s    Left Ventricular Outflow Tract Mean Gradient 1.00 mmHg    RVDD 2.87 cm    TAPSE 2.22 cm    LA size 3.20 cm    AV mean gradient 2 mmHg    AV peak gradient 3 mmHg    Ao peak mann 0.88 m/s    Ao VTI 20.20 cm    LVOT peak VTI 19.10 cm    AV valve area 2.40 cm²    AV Velocity Ratio 0.93     AV index (prosthetic) 0.95     MAHAD by Velocity Ratio 2.37 cm²    MV mean gradient 2 mmHg    MV peak gradient 5 mmHg    MV stenosis pressure 1/2 time 82.00 ms    MV valve area p 1/2 method 2.68 cm2    MV valve area by continuity eq 1.22 cm2    MV VTI 39.7 cm    Triscuspid Valve Regurgitation Peak Gradient 23 mmHg    PV PEAK VELOCITY 0.81 m/s    PV peak gradient 3 mmHg    Mean e' 0.10 m/s    ZLVIDS -1.24     ZLVIDD -2.74     TV resting pulmonary artery pressure 26 mmHg    RV TB RVSP 5 mmHg    Est. RA pres 3 mmHg   CV Ultrasound Bilateral Doppler Carotid    Collection Time: 12/24/23  2:34 PM   Result Value Ref Range    Right CCA prox sys 103 cm/s    Right CCA prox castro 13 cm/s    Right CCA dist sys 73 cm/s    Right CCA dist castro 14 cm/s    Right ICA prox sys 77 cm/s    Right ICA prox castro 19 cm/s    Right ICA mid sys 84 cm/s    Right ICA mid castro 23 cm/s    Right ICA dist sys 95 cm/s    Right ICA dist castro 30 cm/s    Right ECA sys 89 cm/s    Right ECA castro 0 cm/s    Right vertebral sys 62 cm/s    Right vertebral castro 10 cm/s    Right ICA/CCA ratio 1.30     Right Highest ICA 95.00     Right Highest EDV 30.00     Right Highest      Left CCA prox sys  96 cm/s    Left CCA prox castro 16 cm/s    Left CCA dist sys 87 cm/s    Left CCA dist castro 22 cm/s    Left ICA prox sys 87 cm/s    Left ICA prox castro 21 cm/s    Left ICA mid sys 97 cm/s    Left ICA mid castro 26 cm/s    Left ICA dist sys 112 cm/s    Left ICA dist castro 30 cm/s    Left ECA sys 86 cm/s    Left ECA castro 8 cm/s    Left vertebral sys 56 cm/s    Left vertebral castro 11 cm/s    Left ICA/CCA ratio 1.29     Left Highest .00     LT Highest EDV 30.00     Left Highest CCA 96        Significant Imaging:  Imaging Results              MRI Brain Without Contrast (Final result)  Result time 12/24/23 12:53:46      Final result by Sy Hilario MD (12/24/23 12:53:46)                   Impression:      No acute intracranial process identified.    No significant discrepancy between my interpretation and the preliminary radiology report.      Electronically signed by: Sy Hilario  Date:    12/24/2023  Time:    12:53               Narrative:    EXAMINATION:  MRI BRAIN WITHOUT CONTRAST    CLINICAL HISTORY:  Stroke, follow up;    TECHNIQUE:  Multisequence, multiplanar MR imaging of the brain without IV contrast.    COMPARISON:  MRI 07/22/2022    FINDINGS:  No parenchymal areas of restricted diffusion identified.  Few punctate areas of increased T2/FLAIR signal in the supratentorial white matter are likely mild chronic small vessel ischemic change.  No hydrocephalus, midline shift or intracranial hemorrhage.                        Preliminary result by Mike Nobles MD (12/23/23 23:52:05)                   Impression:    1. No abnormal signal is identified on the diffusion images to suggest acute infarct.  2. No acute intracranial process is identified. Details and other findings as discussed above.               Narrative:    START OF REPORT:  Technique: Multiplanar, multisequence magnetic resonance imaging of the brain was performed without intravenous contrast.    Comparison: Correlation is with CT study  [id number].    Clinical history: Stroke follow up.    Findings:  Hemorrhage: No acute intracranial hemorrhage is identified.  Stroke: No abnormal signal is identified on the diffusion images to suggest acute infarct.  Extra axial spaces: The ventricles and sulci and basal cisterns all appear mildly prominent consistent with global cerebral atrophy.  Cerebral, cerebellar, and brainstem parenchyma: Mild periventricular and subcortical white matter signal abnormalities are seen. The main consideration is small vessel ischemic changes in a patient of this age.  Cranial nerves: Normal.  Herniation: None.  Calvarium: Within normal limits.  Vascular system: Normal flow voids.  Visualized paranasal sinuses: Normal.  Visualized orbits: The visualized orbits appear unremarkable.  Sella and skull base: Normal.  Temporal bones and mastoids: Within normal limits.                                         CTA Head and Neck (xpd) (Final result)  Result time 12/24/23 12:48:06      Final result by Sy Hilario MD (12/24/23 12:48:06)                   Impression:      No major branch occlusion or critical stenosis identified.    No significant discrepancy between my interpretation and the preliminary radiology report.      Electronically signed by: Sy Hilario  Date:    12/24/2023  Time:    12:48               Narrative:    EXAMINATION:  CTA HEAD AND NECK (XPD)    CLINICAL HISTORY:  Neuro deficit, acute, stroke suspected;    TECHNIQUE:  CT images of the head and neck before and after the administration of IV contrast according to the angiography protocol. Axial, coronal, sagittal, MIP and 3-D reconstructions are reviewed. Dose length product is 1390 mGycm. Automatic exposure control, adjustment of mA/kV or iterative reconstruction technique was used to limit radiation dose.    COMPARISON:  Head CT earlier on the same day    FINDINGS:  Head CT with contrast:    No interval changes when compared to the previous CT.    No  enhancing abnormalities.    If present, stenosis of the carotid bulbs is measured based on NASCET criteria, i.e. area of maximal stenosis compared to the cervical ICA distal to the bulb.    Cervical CTA:    Common Carotid arteries: Mild motion, but no significant stenosis identified.    Carotid Bulbs/ICAs: Patent, no abnormalities.    Vertebral arteries: Patent, no abnormalities.    Intracranial CTA:    Carotid arteries:    Mild bilateral carotid siphon calcifications.  No high-grade narrowing identified.  Patent M1 and A1 segments bilaterally.    Vertebrobasilar Circulation:    Basilar artery: Patent, no abnormalities.    Posterior cerebral arteries: Patent bilaterally.  Fetal origin of the left PCA.    Dural venous sinuses: Patent    Normal Variants:    ACom:  Patent    PComs: Patent    Vertebral arteries: Co-dominant                        Preliminary result by Mike Nobles MD (12/23/23 21:10:18)                   Impression:    1. Unremarkable CT angiogram of the head and neck. Details and other findings as described above.               Narrative:    START OF REPORT:  Technique: CT angiogram of the intracranial vessels was performed without and with intravenous contrast with direct axial as well as sagittal and coronal reformations. CT angiogram of the neck vessels was performed without and with intravenous contrast with direct axial as well as sagittal and coronal reformations.    Comparison: Comparison is with noncontrast CT head [id number] 20:27:47.    Clinical history: Left upper arm weakness, ataxia Blurred vision Ha.    Findings:  Artifact: Note is made of some mild motion artifact which most prominently affects the region of the carotid bifurcations.  Intracranial Vascular structures:  Internal carotid arteries: Mild atheromatous calcification of the cavernous and clinoid segments of the bilateral internal carotid arteries is seen without significant stenosis.  Middle cerebral arteries:  Unremarkable.  Anterior cerebral arteries: Unremarkable.  Vertebral arteries: The right vertebral artery is dominant.  Basilar artery: Unremarkable.  Posterior cerebral arteries: There is fetal origin of the left posterior cerebral artery with a hypoplastic P1 segment. The right posterior cerebral artery is unremarkable.  Posterior communicating arteries: Bilateral posterior communicating arteries areseen.  Neck Vascular structures: The visualized aorta and origin of the great vessels of the neck appear unremarkable.  Carotids:  Common carotid arteries: Unremarkable.  Internal carotid artery: Unremarkable.  Vertebral arteries: Right vertebral artery is dominant. Both vertebral arteries are patent and normal in caliber. The origins of both vertebral arteries are unremarkable.  Brain parenchyma: No abnormal enhancement is identified.    Miscellaneous: The visualized lungs are slightly heterogeneous, which may reflect small airways disease versus mosaic attenuation.                                         CT HEAD FOR STROKE (Final result)  Result time 12/24/23 12:29:08   Procedure changed from CT Head Without Contrast     Final result by Sy Hilario MD (12/24/23 12:29:08)                   Impression:      No acute intracranial process identified.    No significant discrepancy between my interpretation and the preliminary radiology report.      Electronically signed by: Sy Hilario  Date:    12/24/2023  Time:    12:29               Narrative:    EXAMINATION:  CT HEAD FOR STROKE    CLINICAL HISTORY:  Neuro deficit, acute, stroke suspected;    TECHNIQUE:  CT images of the head without IV contrast. Axial, coronal and sagittal images reviewed. Dose length product 949 mGycm. Automatic exposure control, adjustment of mA/kV or iterative reconstruction technique used to limit radiation dose.    COMPARISON:  Brain MRI 07/22/2022    FINDINGS:  Extra-axial spaces/ventricular system: Normal for age.    Intracranial  hemorrhage: None identified.    Cerebral parenchyma: No acute large vessel territory infarct or mass effect identified.    Vascular system: No hyperdense vessel appreciated.    Cerebellum: Normal.    Sella: Normal.    Included paranasal sinuses and mastoid air cells: Small retention cyst or polyp within the right maxillary sinus.    Visualized orbits: Prior bilateral lens surgery.    Scalp/Calvarium: No depressed skull fracture.                        Preliminary result by Mike Nobles MD (12/23/23 20:53:40)                   Impression:    1. No acute intracranial process identified. Details and other findings as noted above.               Narrative:    START OF REPORT:  Technique: CT of the head was performed without intravenous contrast with axial as well as coronal and sagittal images.    Comparison: None.    Dosage Information: Automated Exposure Control was utilized 948.65 mGy.cm.    Clinical history: Left upper arm weakness, ataxia Blurred vision Ha.    Findings:  Hemorrhage: No acute intracranial hemorrhage is seen.  CSF spaces: The ventricles, sulci and basal cisterns all appear mildly prominent consistent with global cerebral atrophy.  Brain parenchyma: There is preservation of the grey white junction throughout. No acute infarct is identified. Subtle scattered microvascular change is seen in portions of the periventricular and deep white matter tracts.  Cerebellum: Unremarkable.  Vascular: Atheromatous calcification of the intracranial arteries is seen.  Sella and skull base: The sella appears to be within normal limits for age.  Cerebellopontine angles: Within normal limits.  Herniation: None.  Intracranial calcifications: Incidental note is made of bilateral choroid plexus calcification. Incidental note is made of some pineal region calcification. Incidental note is made of some calcification of the falx.  Calvarium: No acute linear or depressed skull fracture is seen.    Maxillofacial  Structures:  Paranasal sinuses: The visualized paranasal sinuses appear clear with no significant mucoperiosteal thickening or air fluid levels identified.  Orbits: Both native ocular lens are absent.  Zygomatic arches: The zygomatic arches are intact and unremarkable.  Temporal bones and mastoids: The temporal bones and mastoids appear unremarkable.  TMJ: The mandibular condyles appear normally placed with respect to the mandibular fossa.  Nasal Bones: No displaced nasal bone fracture is seen.    Visualized upper cervical spine: The visualized cervical spine appears unremarkable.    Notifications: This is a stroke protocol report and the results were discussed with the emergency room physician (Dr Burt) prior to dictation at 2023-12-23 20:53:23 CST.                                        EKG:  No results found for this visit on 12/23/23.    Telemetry:      Physical Exam  Constitutional:       Appearance: Normal appearance.   Cardiovascular:      Rate and Rhythm: Normal rate and regular rhythm.   Pulmonary:      Effort: Pulmonary effort is normal.   Neurological:      General: No focal deficit present.      Mental Status: She is alert and oriented to person, place, and time.         Home Medications:   No current facility-administered medications on file prior to encounter.     Current Outpatient Medications on File Prior to Encounter   Medication Sig Dispense Refill    ALPRAZolam (XANAX) 0.25 MG tablet Take 0.25 mg by mouth 2 (two) times daily as needed.      aspirin (ECOTRIN) 81 MG EC tablet Take 81 mg by mouth once daily.      ezetimibe (ZETIA) 10 mg tablet Take 10 mg by mouth once daily.      multivit-mins no.63/iron/folic (M-VIT ORAL) Take by mouth.         Current Inpatient Medications:    Current Facility-Administered Medications:     0.9%  NaCl infusion, , Intravenous, Continuous, Zhane Hair NP, Last Rate: 100 mL/hr at 12/24/23 1808, Rate Verify at 12/24/23 1808    aspirin chewable tablet 324 mg,  324 mg, Oral, Daily, Nolvia Ramirez, DO    ondansetron injection 4 mg, 4 mg, Intravenous, Q8H PRN, Zhane Hair NP         VTE Risk Mitigation (From admission, onward)           Ordered     Reason for No Pharmacological VTE Prophylaxis  Once        Question:  Reasons:  Answer:  Risk of Bleeding    12/24/23 0849     IP VTE HIGH RISK PATIENT  Once         12/24/23 0849     Place sequential compression device  Until discontinued         12/24/23 0849                    Assessment:   Sudden Onset Ataxia, Left 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 EF preserved, no WMA noted  No known previous Hx of arrhythmias/Afib. Patient previously on oral anticoagulation due to history of PE.   Patient to follow up [facility], Dr. Bragg with 2 week event monitor upon discharge to assess for any arrhythmias/atrial fibrillation  Will be available. Please call with any questions        ROSARIO Rangel  Cardiology  [facility] - [address] [place] [address]  12/25/2023 8:22 AM     I have seen the patient, reviewed the Nurse Practitioner's note, assessment and plan. I have personally interviewed and examined the patient at bedside and agree with the findings. Medical decision making listed above were done under my guidance.

## 2023-12-25 NOTE — NURSING
Nurses Note -- 4 Eyes      12/25/2023   7:40 AM      Skin assessed during: Daily Assessment      [x] No Altered Skin Integrity Present    [x]Prevention Measures Documented      [] Yes- Altered Skin Integrity Present or Discovered   [] LDA Added if Not in Epic (Describe Wound)   [] New Altered Skin Integrity was Present on Admit and Documented in LDA   [] Wound Image Taken    Wound Care Consulted? No    Attending Nurse:  Gina Peck RN/Staff Member:   MARIA ALEJANDRA Castro

## 2023-12-25 NOTE — NURSING
Pt educated on all DC info including follow ups with Dr. Diop, Dr. Banks, and Dr. Bragg. VSS. NAD. IV & tele DC. AAOx4. Pt given DC packet & verbalized understanding of all DC info. Pt going home via private vehicle.

## 2023-12-25 NOTE — PT/OT/SLP EVAL
Occupational Therapy   Evaluation and Discharge Note    Name: Hilda Ordonez  MRN: [id number]  Admitting Diagnosis: Left sided weakness/ataxia  Recent Surgery: * No surgery found *      Recommendations:     Discharge therapy intensity: No Therapy Indicated   Discharge Equipment Recommendations: none  Barriers to discharge:  None    Assessment:     Hilda Ordonez is a 73 y.o. female with a medical diagnosis of left sided weakness and ataxia s/p TNK 12/23. On eval, patient reports feeling back to baseline. Pt completed lower body dressing, toilet transfer, and functional mobility in hallway independently with no use of AD and no LOB. Skilled OT services are not warranted at this time.    Plan:     OT to sign off as acute OT services are not warranted at this time.  Please re-consult if situation changes during this hospitalization.    Plan of Care Reviewed with: patient    Subjective     Chief Complaint: none  Patient/Family Comments/goals: to go home    Occupational Profile:  Living Environment: lives with 2 sons in [facility] with no steps to enter; walk in shower with chair  Previous level of function: Independent  Roles and Routines: mother, personal ADLs and IADLs, works at [employer]  Equipment Used at Home: none  Assistance upon Discharge: family    Pain/Comfort:  Pain Rating 1: 0/10    Patients cultural, spiritual, Pentec[employer]al conflicts given the current situation: no    Objective:     OT communicated with RN prior to session.      Patient was found HOB elevated with blood pressure cuff, pulse ox (continuous), telemetry upon OT entry to room.    General Precautions: Standard, other (see comments) (BP<140/90)  Orthopedic Precautions: N/A  Braces: N/A    Vital Signs: Blood Pressure: 112/51  HR: 67  Sp02: 95  Respiratory Status: on room air    Bed Mobility:    Patient completed Supine to Sit with independence  Patient completed Sit to Supine with independence    Functional Mobility/Transfers:  Patient completed Toilet  Transfer Step Transfer technique with independence with  no AD  Functional Mobility: no LOB, independent with mobility    Activities of Daily Living:  Lower Body Dressing: independence    Toileting: independence      Functional Cognition:  Orientation: oriented to Person, Place, Time, and Situation  Safety Awareness: WFL    Visual Perceptual Skills:  Pursuits: WFL  Convergence/Divergence: WFL    Upper Extremity Function:  Right Upper Extremity:   Strength: 5/5  Coordination: WFL finger opposition  Sensation: WFL    Left Upper Extremity:  Strength: 5/5  Coordination: WFL finger opposition  Sensation: WFL    Balance:   Static sitting balance: WFL  Dynamic sitting balance:WFL  Static standing balance:WFL  Dynamic standing balance:WFL    Therapeutic Positioning  Risk for acquired pressure injuries is decreased due to ability to mobilize independently .    OT interventions performed during the course of today's session in an effort to prevent and/or reduce acquired pressure injuries:   Education was provided on benefits of and recommendations for therapeutic positioning    Skin assessment: all bony prominences were assessed    Findings: no redness or breakdown noted    OT recommendations for therapeutic positioning throughout hospitalization:   Follow [facility] Pressure Injury Prevention Protocol    Patient Education:  Patient provided with verbal education education regarding OT role/goals/POC, fall prevention, safety awareness, and Discharge/DME recommendations.  Understanding was verbalized.     Patient left HOB elevated with all lines intact and call button in reach    History:     Past Medical History:   Diagnosis Date    History of CVA (cerebrovascular accident)     HLD (hyperlipidemia)          Past Surgical History:   Procedure Laterality Date    BLADDER SUSPENSION      CATARACT EXTRACTION      PARS PLANA VITRECTOMY W/ REPAIR OF MACULAR HOLE      PARTIAL HYSTERECTOMY      SPINE SURGERY      TYMPANOSTOMY TUBE  PLACEMENT         Time Tracking:     OT Date of Treatment: 12/25/23  OT Start Time: 0807  OT Stop Time: 0820  OT Total Time (min): 13 min    Billable Minutes:Evaluation Low intensity    12/25/2023

## 2023-12-25 NOTE — PROGRESS NOTES
"[facility] - [facility] ICU  Pulmonary Critical Care Note    Patient Name: Hilda Ordonez  MRN: [id number]  Admission Date: 12/23/2023  Hospital Length of Stay: 2 days  Code Status: Full Code  Attending Provider: JAIME Donahue MD  Primary Care Provider: Valentin Diop MD     Subjective:     HPI:   Hilda Ordonez is a 73 y.o. female with PMH of  stroke (2015), HLD presented to [facility] ED 12/23/2023 due to complaints of left upper extremity ataxia, and left sided vision changes. Per chart ED report, patient's LKW was 18:15 today, when she began experiencing "light" sensation with acute onset while cooking. This was associated with left blurred vision, headache, and neck pain. Per additional history by ED physician she is on aspirin daily. Reportedly took anticoagulants previously. In the ED patient was initially hypertensive to 179/91, however most recent VSS were normal. NIHSS was 2. Labs were unremarkable with normal PT/INR, APTT, CBC, CMP, TSH, BNP, Troponin. CT Head and CTA unremarkable for hemorrhage or LVO. Given patient was within the window for TNK administration and after speaking with Neurology, TNK was administered. Critical care was consulted for ICU admission for post fibronolytic monitoring.    Hospital Course/Significant events:  12/23/2023 - Admitted to ICU s/p TNK    24 Hour Interval History:  No acute events overnight. She has no acute complaints; able to move all extremities with ease, speech is clear.     Past Medical History:   Diagnosis Date    History of CVA (cerebrovascular accident)     HLD (hyperlipidemia)        Past Surgical History:   Procedure Laterality Date    BLADDER SUSPENSION      CATARACT EXTRACTION      PARS PLANA VITRECTOMY W/ REPAIR OF MACULAR HOLE      PARTIAL HYSTERECTOMY      SPINE SURGERY      TYMPANOSTOMY TUBE PLACEMENT         Social History     Socioeconomic History    Marital status:    Tobacco Use    Smoking status: Never    Smokeless tobacco: Never "   Substance and Sexual Activity    Alcohol use: Never    Drug use: Never       Current Outpatient Medications   Medication Instructions    ALPRAZolam (XANAX) 0.25 mg, Oral, 2 times daily PRN    aspirin (ECOTRIN) 81 mg, Oral, Daily    ezetimibe (ZETIA) 10 mg, Oral, Daily    multivit-mins no.63/iron/folic (M-VIT ORAL) Oral     Current Inpatient Medications    Current Intravenous Infusions   sodium chloride 0.9% 100 mL/hr at 12/24/23 1808       Objective:     Intake/Output Summary (Last 24 hours) at 12/25/2023 0422  Last data filed at 12/24/2023 1808  Gross per 24 hour   Intake 876.96 ml   Output --   Net 876.96 ml     Vital Signs (Most Recent):  Temp: 98 °F (36.7 °C) (12/25/23 0400)  Pulse: 61 (12/25/23 0400)  Resp: 19 (12/25/23 0400)  BP: 106/66 (12/25/23 0400)  SpO2: (!) 92 % (12/25/23 0400)  Body mass index is 23.45 kg/m².  Weight: 65.9 kg (145 lb 4.5 oz) Vital Signs (24h Range):  Temp:  [97.6 °F (36.4 °C)-98.7 °F (37.1 °C)] 98 °F (36.7 °C)  Pulse:  [55-69] 61  Resp:  [13-23] 19  SpO2:  [91 %-97 %] 92 %  BP: ()/(43-90) 106/66     Physical Exam  General: Well nourished w/o distress  HEENT: NC/AT; PERRL; nasal and oral mucosa moist and clear  Pulm: CTA bilaterally, normal work of breathing  CV: S1, S2 w/o murmurs or gallops; no edema noted  GI: Bowel sound present in all quadrants, abdomen soft to palpation  MSK: Full ROM of all extremities  Neuro: AAOx4; motor/sensory function intact  Psych: Cooperative; appropriate mood and affect    Lines/Drains/Airways       Drain  Duration             Female External Urinary Catheter w/ Suction -- days              Peripheral Intravenous Line  Duration                  Peripheral IV - Single Lumen 12/23/23 2020 20 G Anterior;Distal;Right Forearm 1 day         Peripheral IV - Single Lumen 12/23/23 2114 20 G Anterior;Left Forearm 1 day                  Significant Labs:  Lab Results   Component Value Date    WBC 6.29 12/23/2023    HGB 13.1 12/23/2023    HCT 39.5 12/23/2023  "   MCV 89.6 12/23/2023     12/23/2023       BMP  Lab Results   Component Value Date     12/23/2023    K 3.9 12/23/2023    CHLORIDE 107 12/23/2023    CO2 26 12/23/2023    BUN 15.1 12/23/2023    CREATININE 0.97 12/23/2023    CALCIUM 9.5 12/23/2023    EGFRNONAA >60 06/08/2022     ABG  No results for input(s): "PH", "PO2", "PCO2", "HCO3", "BE" in the last 168 hours.  Mechanical Ventilation Support:       Significant Imaging:  I have reviewed the pertinent imaging within the past 24 hours.    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 S/P TNK administration  Echo w/ saline study 12/24/2023 negative  HX of pulmonary embolism (was on Eliquis)  HLD  Hx of previous stroke (2015) with similar findings  Anxiety    Plan  -Will consider downgrade to hospital medicine unit if overnight CT head revealed no acute abnormalities, patient has been stable   -Will give ASA 325mg today  -Neurology and cardiology teams following, appreciate their input  -Will need outpatient cardiology F/U for event monitor to r/o A-fib or cardiac arrhythmias     DVT Prophylaxis:  GI Prophylaxis:     32 minutes of critical care was time spent personally by me on the following activities: development of treatment plan with patient or surrogate and bedside caregivers, discussions with consultants, evaluation of patient's response to treatment, examination of patient, ordering and performing treatments and interventions, ordering and review of laboratory studies, ordering and review of radiographic studies, pulse oximetry, re-evaluation of patient's condition.  This critical care time did not overlap with that of any other provider or involve time for any procedures.     Nolvia Ramirez DO, PGY-II  Pulmonary Critical Care Medicine  [facility] - [facility] ICU   "

## 2023-12-25 NOTE — DISCHARGE SUMMARY
"[facility] - [facility]  Pulmonary Critical Care Note    Patient Name: Hilda Ordonez  MRN: [id number]  Admission Date: 12/23/2023  Hospital Length of Stay: 2 days  Code Status: Full Code  Attending Provider: JAIME Donahue MD  Primary Care Provider: Valentin Diop MD     Subjective:     HPI:   Hilda Ordonez is a 73 y.o. female with PMH of  stroke (2015), HLD presented to [facility] ED 12/23/2023 due to complaints of left upper extremity ataxia, and left sided vision changes. Per chart ED report, patient's LKW was 18:15 today, when she began experiencing "light" sensation with acute onset while cooking. This was associated with left blurred vision, headache, and neck pain. Per additional history by ED physician she is on aspirin daily. Reportedly took anticoagulants previously. In the ED patient was initially hypertensive to 179/91, however most recent VSS were normal. NIHSS was 2. Labs were unremarkable with normal PT/INR, APTT, CBC, CMP, TSH, BNP, Troponin. CT Head and CTA unremarkable for hemorrhage or LVO. Given patient was within the window for TNK administration and after speaking with Neurology, TNK was administered. Critical care was consulted for ICU admission for post fibronolytic monitoring.     Neurology has rounded this AM and cleared discharge home.       Current Outpatient Medications   Medication Instructions    ALPRAZolam (XANAX) 0.25 mg, Oral, 2 times daily PRN    aspirin (ECOTRIN) 81 mg, Oral, Daily    ezetimibe (ZETIA) 10 mg, Oral, Daily    multivit-mins no.63/iron/folic (M-VIT ORAL) Oral         Objective:       Vital Signs (Most Recent):  Temp: 97.7 °F (36.5 °C) (12/25/23 0800)  Pulse: 60 (12/25/23 0800)  Resp: 16 (12/25/23 0800)  BP: (!) 112/51 (12/25/23 0800)  SpO2: (!) 93 % (12/25/23 0800)  Body mass index is 23.45 kg/m².  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   - They are Recommending to follow up in their clinic in 2-4 weeks with Dr. Banks and also to start aspirin 325 mg daily upon discharge (she was taking aspirin 81 mg at home) and encouraged to return to work AFTER the new year   - any questions or worsening symptoms patient needs to contact neurology or procedure to the nearest ER     FELIBERTO Tinajero-BC  Pulmonary Critical Care Medicine  [facility] - [facility]

## 2023-12-25 NOTE — PLAN OF CARE
Problem: Adult Inpatient Plan of Care  Goal: Plan of Care Review  Outcome: Ongoing, Progressing  Goal: Patient-Specific Goal (Individualized)  Outcome: Ongoing, Progressing  Goal: Absence of Hospital-Acquired Illness or Injury  Outcome: Ongoing, Progressing  Goal: Optimal Comfort and Wellbeing  Outcome: Ongoing, Progressing  Goal: Readiness for Transition of Care  Outcome: Ongoing, Progressing     Problem: Adjustment to Illness (Stroke, Ischemic/Transient Ischemic Attack)  Goal: Optimal Coping  Outcome: Ongoing, Progressing     Problem: Bowel Elimination Impaired (Stroke, Ischemic/Transient Ischemic Attack)  Goal: Effective Bowel Elimination  Outcome: Ongoing, Progressing     Problem: Cerebral Tissue Perfusion (Stroke, Ischemic/Transient Ischemic Attack)  Goal: Optimal Cerebral Tissue Perfusion  Outcome: Ongoing, Progressing     Problem: Cognitive Impairment (Stroke, Ischemic/Transient Ischemic Attack)  Goal: Optimal Cognitive Function  Outcome: Ongoing, Progressing     Problem: Communication Impairment (Stroke, Ischemic/Transient Ischemic Attack)  Goal: Improved Communication Skills  Outcome: Ongoing, Progressing     Problem: Functional Ability Impaired (Stroke, Ischemic/Transient Ischemic Attack)  Goal: Optimal Functional Ability  Outcome: Ongoing, Progressing     Problem: Sensorimotor Impairment (Stroke, Ischemic/Transient Ischemic Attack)  Goal: Improved Sensorimotor Function  Outcome: Ongoing, Progressing     Problem: Skin Injury Risk Increased  Goal: Skin Health and Integrity  Outcome: Ongoing, Progressing

## 2023-12-25 NOTE — PLAN OF CARE
Problem: Adult Inpatient Plan of Care  Goal: Plan of Care Review  12/25/2023 0944 by Gina Zaragoza RN  Outcome: Met  12/25/2023 0747 by Gina Zaragoza RN  Outcome: Ongoing, Progressing  Goal: Patient-Specific Goal (Individualized)  12/25/2023 0944 by Gina Zaragoza RN  Outcome: Met  12/25/2023 0747 by Gina Zaragoza RN  Outcome: Ongoing, Progressing  Goal: Absence of Hospital-Acquired Illness or Injury  12/25/2023 0944 by Gina Zaragoza RN  Outcome: Met  12/25/2023 0747 by Gina Zaragoza RN  Outcome: Ongoing, Progressing  Goal: Optimal Comfort and Wellbeing  12/25/2023 0944 by Gina Zaragoza RN  Outcome: Met  12/25/2023 0747 by Gina Zaragoza RN  Outcome: Ongoing, Progressing  Goal: Readiness for Transition of Care  12/25/2023 0944 by Gina Zaragoza RN  Outcome: Met  12/25/2023 0747 by Gina Zaragoza RN  Outcome: Ongoing, Progressing     Problem: Adjustment to Illness (Stroke, Ischemic/Transient Ischemic Attack)  Goal: Optimal Coping  12/25/2023 0944 by Gina Zaragoza RN  Outcome: Met  12/25/2023 0747 by Gina Zaragoza RN  Outcome: Ongoing, Progressing     Problem: Bowel Elimination Impaired (Stroke, Ischemic/Transient Ischemic Attack)  Goal: Effective Bowel Elimination  12/25/2023 0944 by Gina Zaragoza RN  Outcome: Met  12/25/2023 0747 by Gina Zaragoza RN  Outcome: Ongoing, Progressing     Problem: Cerebral Tissue Perfusion (Stroke, Ischemic/Transient Ischemic Attack)  Goal: Optimal Cerebral Tissue Perfusion  12/25/2023 0944 by Gina Zaragoza RN  Outcome: Met  12/25/2023 0747 by Gina Zaragoza RN  Outcome: Ongoing, Progressing     Problem: Cognitive Impairment (Stroke, Ischemic/Transient Ischemic Attack)  Goal: Optimal Cognitive Function  12/25/2023 0944 by Gina Zaragoza RN  Outcome: Met  12/25/2023 0747 by Gina Zaragoza RN  Outcome: Ongoing, Progressing     Problem: Communication Impairment (Stroke, Ischemic/Transient Ischemic Attack)  Goal: Improved Communication Skills  12/25/2023 0944  by Zaragoza, Gina, RN  Outcome: Met  12/25/2023 0747 by Gina Zaragoza RN  Outcome: Ongoing, Progressing     Problem: Functional Ability Impaired (Stroke, Ischemic/Transient Ischemic Attack)  Goal: Optimal Functional Ability  12/25/2023 0944 by Gina Zaragoza RN  Outcome: Met  12/25/2023 0747 by Gina Zaragoza RN  Outcome: Ongoing, Progressing     Problem: Sensorimotor Impairment (Stroke, Ischemic/Transient Ischemic Attack)  Goal: Improved Sensorimotor Function  12/25/2023 0944 by Gina Zaragoza RN  Outcome: Met  12/25/2023 0747 by Gina Zaragoza RN  Outcome: Ongoing, Progressing     Problem: Skin Injury Risk Increased  Goal: Skin Health and Integrity  12/25/2023 0944 by Gina Zaragoza RN  Outcome: Met  12/25/2023 0747 by Gina Zaragoza RN  Outcome: Ongoing, Progressing

## 2023-12-26 LAB
LEFT CCA DIST DIAS: 22 CM/S
LEFT CCA DIST SYS: 87 CM/S
LEFT CCA PROX DIAS: 16 CM/S
LEFT CCA PROX SYS: 96 CM/S
LEFT ECA DIAS: 8 CM/S
LEFT ECA SYS: 86 CM/S
LEFT ICA DIST DIAS: 30 CM/S
LEFT ICA DIST SYS: 112 CM/S
LEFT ICA MID DIAS: 26 CM/S
LEFT ICA MID SYS: 97 CM/S
LEFT ICA PROX DIAS: 21 CM/S
LEFT ICA PROX SYS: 87 CM/S
LEFT VERTEBRAL DIAS: 11 CM/S
LEFT VERTEBRAL SYS: 56 CM/S
OHS CV CAROTID RIGHT ICA EDV HIGHEST: 30
OHS CV CAROTID ULTRASOUND LEFT ICA/CCA RATIO: 1.29
OHS CV CAROTID ULTRASOUND RIGHT ICA/CCA RATIO: 1.3
OHS CV PV CAROTID LEFT HIGHEST CCA: 96
OHS CV PV CAROTID LEFT HIGHEST ICA: 112
OHS CV PV CAROTID RIGHT HIGHEST CCA: 103
OHS CV PV CAROTID RIGHT HIGHEST ICA: 95
OHS CV US CAROTID LEFT HIGHEST EDV: 30
RIGHT CCA DIST DIAS: 14 CM/S
RIGHT CCA DIST SYS: 73 CM/S
RIGHT CCA PROX DIAS: 13 CM/S
RIGHT CCA PROX SYS: 103 CM/S
RIGHT ECA DIAS: 0 CM/S
RIGHT ECA SYS: 89 CM/S
RIGHT ICA DIST DIAS: 30 CM/S
RIGHT ICA DIST SYS: 95 CM/S
RIGHT ICA MID DIAS: 23 CM/S
RIGHT ICA MID SYS: 84 CM/S
RIGHT ICA PROX DIAS: 19 CM/S
RIGHT ICA PROX SYS: 77 CM/S
RIGHT VERTEBRAL DIAS: 10 CM/S
RIGHT VERTEBRAL SYS: 62 CM/S

## 2024-01-23 ENCOUNTER — HOSPITAL ENCOUNTER (OUTPATIENT)
Facility: HOSPITAL | Age: 74
Discharge: HOME | End: 2024-01-23
Attending: INTERNAL MEDICINE | Admitting: INTERNAL MEDICINE
Payer: MEDICARE

## 2024-01-23 VITALS
HEART RATE: 64 BPM | OXYGEN SATURATION: 99 % | WEIGHT: 145.5 LBS | DIASTOLIC BLOOD PRESSURE: 81 MMHG | HEIGHT: 67 IN | SYSTOLIC BLOOD PRESSURE: 152 MMHG | BODY MASS INDEX: 22.84 KG/M2 | TEMPERATURE: 98 F

## 2024-01-23 DIAGNOSIS — I25.10 CAD (CORONARY ARTERY DISEASE): ICD-10-CM

## 2024-01-23 DIAGNOSIS — I63.9 IMPENDING CEREBROVASCULAR ACCIDENT: ICD-10-CM

## 2024-01-23 PROCEDURE — 93010 ELECTROCARDIOGRAM REPORT: CPT | Mod: ,,, | Performed by: INTERNAL MEDICINE

## 2024-01-23 PROCEDURE — 25000003 PHARM REV CODE 250: Performed by: INTERNAL MEDICINE

## 2024-01-23 PROCEDURE — 93005 ELECTROCARDIOGRAM TRACING: CPT

## 2024-01-23 PROCEDURE — C1764 EVENT RECORDER, CARDIAC: HCPCS | Performed by: INTERNAL MEDICINE

## 2024-01-23 PROCEDURE — 33285 INSJ SUBQ CAR RHYTHM MNTR: CPT | Performed by: INTERNAL MEDICINE

## 2024-01-23 DEVICE — LUX-DX™ INSERTABLE CARDIAC MONITOR
Type: IMPLANTABLE DEVICE | Site: CHEST  WALL | Status: FUNCTIONAL
Brand: LUX-DX™ INSERTABLE CARDIAC MONITOR

## 2024-01-23 RX ORDER — LIDOCAINE HYDROCHLORIDE 10 MG/ML
INJECTION INFILTRATION; PERINEURAL
Status: DISCONTINUED | OUTPATIENT
Start: 2024-01-23 | End: 2024-01-23 | Stop reason: HOSPADM

## 2024-01-23 RX ORDER — DIAZEPAM 5 MG/1
10 TABLET ORAL
Status: DISPENSED | OUTPATIENT
Start: 2024-01-23

## 2024-01-23 RX ORDER — ZINC GLUCONATE 50 MG
500 TABLET ORAL DAILY
COMMUNITY

## 2024-01-23 RX ORDER — BISMUTH SUBSALICYLATE 262 MG/1
TABLET ORAL
COMMUNITY

## 2024-01-23 RX ADMIN — DIAZEPAM 10 MG: 5 TABLET ORAL at 06:01

## 2024-01-23 NOTE — H&P
Patient name: Hilda Ordonez  MRN: [id number]  : 1950  Cath Lab Procedure H&P Update    Pre-Procedure Assessment:    I saw and examined the patient face to face. The patient has been re-evaluated and her condition is unchanged. The reason for admission, procedure and care is still present.  Based on the patients H&P, pre-procedure physical exam, relevant diagnostic studies, NPO status and information obtained from the patient, I determine the patient is an appropriate candidate for the proposed procedure and anesthesia planned. I further certify the anesthesia risks, benefits and options have been explained to the patient to which she agrees as documented on the procedural consent.    See scanned updated H&P and additional records from media tab.      Tucker Bragg

## 2024-01-23 NOTE — DISCHARGE SUMMARY
[facility] - Cath Lab Services  Discharge Note  Short Stay    Procedure(s) (LRB):  Insertion, Implantable Loop Recorder (N/A)      OUTCOME: Patient tolerated treatment/procedure well without complication and is now ready for discharge.    DISPOSITION: Home or Self Care    FINAL DIAGNOSIS:  cryptogenic stroke    FOLLOWUP: In clinic    DISCHARGE INSTRUCTIONS:  No discharge procedures on file.      Clinical Reference Documents Added to Patient Instructions         Document    IMPLANTABLE LOOP RECORDER (ENGLISH)            TIME SPENT ON DISCHARGE: 35 minutes

## 2024-01-23 NOTE — DISCHARGE INSTRUCTIONS
Do not get the incision wet for 2 days.   Do not put anything on the incision such as ointments, creams, powders, alcohol, peroxide, etc.  Sterile glue was used, it will flake off like a scab over time. Do not pick at it as it is serving as a wound cover.   If incision looks red or swollen with any discharge coming out of it or if you start running a fever go back to your nearest emergency room.   Call the office if you have any questions [phone].

## 2024-02-08 ENCOUNTER — OFFICE VISIT (OUTPATIENT)
Dept: NEUROLOGY | Facility: CLINIC | Age: 74
End: 2024-02-08
Payer: MEDICARE

## 2024-02-08 VITALS
HEIGHT: 67 IN | BODY MASS INDEX: 22.76 KG/M2 | DIASTOLIC BLOOD PRESSURE: 84 MMHG | WEIGHT: 145 LBS | HEART RATE: 67 BPM | SYSTOLIC BLOOD PRESSURE: 126 MMHG

## 2024-02-08 DIAGNOSIS — I63.9 STROKE ABORTED BY ADMINISTRATION OF THROMBOLYTIC AGENT: Primary | ICD-10-CM

## 2024-02-08 DIAGNOSIS — I26.99 PULMONARY EMBOLISM, UNSPECIFIED CHRONICITY, UNSPECIFIED PULMONARY EMBOLISM TYPE, UNSPECIFIED WHETHER ACUTE COR PULMONALE PRESENT: ICD-10-CM

## 2024-02-08 DIAGNOSIS — E78.5 HYPERLIPIDEMIA, UNSPECIFIED HYPERLIPIDEMIA TYPE: ICD-10-CM

## 2024-02-08 DIAGNOSIS — R73.03 PREDIABETES: ICD-10-CM

## 2024-02-08 PROCEDURE — 99213 OFFICE O/P EST LOW 20 MIN: CPT | Mod: PBBFAC | Performed by: PSYCHIATRY & NEUROLOGY

## 2024-02-08 PROCEDURE — 99999 PR PBB SHADOW E&M-EST. PATIENT-LVL III: CPT | Mod: PBBFAC,,, | Performed by: PSYCHIATRY & NEUROLOGY

## 2024-02-08 PROCEDURE — 99215 OFFICE O/P EST HI 40 MIN: CPT | Mod: S$PBB,,, | Performed by: PSYCHIATRY & NEUROLOGY

## 2024-02-08 RX ORDER — APIXABAN 5 MG/1
5 TABLET, FILM COATED ORAL 2 TIMES DAILY
COMMUNITY
Start: 2024-01-22 | End: 2024-05-15

## 2024-02-08 NOTE — PROGRESS NOTES
Neurology Office Visit  Neurology  HPI:    Hilda Ordonez is a 73 y.o. female who presents to clinic for follow up from a recent hospitalization for LUE coordination and visual difficulty in Dec 2023. She had received TNK following which she had returned back to her baseline. Her work up included MRI negative for acute infarct. She had a loop recorder implanted. She was discharged on aspirin 325mg for stroke prevention. She is allergic to statin.     Today, she states that she has not had any recurrence of symptoms. She is currently taking eliquis and aspirin as she was instructed. I reviewed the discharge note and there was no mention of resuming eliquis. She said she is confused if she should take eliquis or not as her PCP says not to take it but her cardiologist says to take it. She has a history of provoked DVT/PE after a transatlantic flight. She has not had any other clotting problem. As far as she knows, she has never been told if she has cardiac arrhythmia. She reports vaginal spotting but denies any other bleeding issues. She has a hiatal hernia but denies any black tarry stool or GI bleeding.     ROS:  Review of Systems   HENT:  Negative for nosebleeds.    Eyes:  Negative for blurred vision and double vision.   Respiratory:  Negative for hemoptysis.    Gastrointestinal:  Negative for blood in stool, melena, nausea and vomiting.   Genitourinary:  Negative for hematuria.        Vaginal spotting   Neurological:  Negative for dizziness, sensory change, speech change, focal weakness and headaches.   Endo/Heme/Allergies:  Does not bruise/bleed easily.        Past Medical History:   Diagnosis Date    History of CVA (cerebrovascular accident)     HLD (hyperlipidemia)      Past Surgical History:   Procedure Laterality Date    BLADDER SUSPENSION      CATARACT EXTRACTION      INSERTION OF IMPLANTABLE LOOP RECORDER N/A 1/23/2024    Procedure: Insertion, Implantable Loop Recorder;  Surgeon: Tucker Bragg MD;   Location: [facility] CATH LAB;  Service: Cardiology;  Laterality: N/A;  ILR IMPLANT (BS)    PARS PLANA VITRECTOMY W/ REPAIR OF MACULAR HOLE      PARTIAL HYSTERECTOMY      SPINE SURGERY      TYMPANOSTOMY TUBE PLACEMENT       Family History   Problem Relation Age of Onset    Vitamin D deficiency Father     Cancer Father      Review of patient's allergies indicates:   Allergen Reactions    Gabapentin     Statins-hmg-coa reductase inhibitors      Other reaction(s): Angioedema       Current Outpatient Medications:     ALPRAZolam (XANAX) 0.25 MG tablet, Take 0.25 mg by mouth 2 (two) times daily as needed., Disp: , Rfl:     aspirin 81 MG Chew, Patient needs to take 325 mg of aspirin daily (our system is not letting me enter 325 mg daily; its making choose 81 mg tabs (4 tabs) to equal 324 mg daily - which is incorrect)., Disp: 120 tablet, Rfl: 0    bismuth subsalicylate (BISMUTH) 262 mg Chew, Take by mouth., Disp: , Rfl:     ELIQUIS 5 mg Tab, Take 5 mg by mouth 2 (two) times daily., Disp: , Rfl:     ezetimibe (ZETIA) 10 mg tablet, Take 10 mg by mouth once daily., Disp: , Rfl:     magnesium oxide 500 mg Cap, Take 500 mg by mouth once daily., Disp: , Rfl:     multivit-min36/iron/folic acid (GERITOL COMPLETE ORAL), Take by mouth., Disp: , Rfl:     multivit-mins no.63/iron/folic (M-VIT ORAL), Take by mouth., Disp: , Rfl:     Current Facility-Administered Medications:     aspirin tablet 325 mg, 325 mg, Oral, Daily, Destiny Sheehan, ANP    Facility-Administered Medications Ordered in Other Visits:     diazePAM tablet 10 mg, 10 mg, Oral, On Call Procedure, Tucker Bragg MD, 10 mg at 01/23/24 0630  Outpatient Medications Marked as Taking for the 2/8/24 encounter (Office Visit) with Ti Banks MD   Medication Sig Dispense Refill    ALPRAZolam (XANAX) 0.25 MG tablet Take 0.25 mg by mouth 2 (two) times daily as needed.      aspirin 81 MG Chew Patient needs to take 325 mg of aspirin daily (our system is not letting me enter 325 mg  daily; its making choose 81 mg tabs (4 tabs) to equal 324 mg daily - which is incorrect). 120 tablet 0    bismuth subsalicylate (BISMUTH) 262 mg Chew Take by mouth.      ELIQUIS 5 mg Tab Take 5 mg by mouth 2 (two) times daily.      ezetimibe (ZETIA) 10 mg tablet Take 10 mg by mouth once daily.      magnesium oxide 500 mg Cap Take 500 mg by mouth once daily.      multivit-min36/iron/folic acid (GERITOL COMPLETE ORAL) Take by mouth.      multivit-mins no.63/iron/folic (M-VIT ORAL) Take by mouth.       Current Facility-Administered Medications for the 2/8/24 encounter (Office Visit) with Ti Banks MD   Medication Dose Route Frequency Provider Last Rate Last Admin    aspirin tablet 325 mg  325 mg Oral Daily Destiny Sheehan, FELIBERTO         Social History     Tobacco Use    Smoking status: Never    Smokeless tobacco: Never   Substance Use Topics    Alcohol use: Never    Drug use: Never         Vitals:    02/08/24 1504   BP: 126/84   Pulse: 67       Physical Exam:  HEENT NC/AT  CV RRR, no tenderness  Resp clear without dyspnea  GI soft  Ext no edema    Neuro:  Alert & oriented x 3  EOMI, PERRLA, visual field intact in all 4 quadrants  Face symmetric, speech clear and fluent, facial sensation equal bilaterally  Tongue midline  Strength 5/5 in bilateral upper and lower extremities   Sensation intact and equal bilaterally  Gait steady and balanced     Imaging:  Reviewed CT, MRI     Latest Reference Range & Units 12/23/23 20:38   Cholesterol Total <=200 mg/dL 190   HDL 35 - 60 mg/dL 59   Total Cholesterol/HDL Ratio 0 - 5  3   Triglycerides 37 - 140 mg/dL 124   LDL Cholesterol 50.00 - 140.00 mg/dL 106.00   Very Low Density Lipoprotein  25     A1c 5.9    Assessment:   Ms Ordonez is a pleasant 73 year old female who presents to clinic for follow up from a recent hospitalization for LUE coordination and visual difficulty in Dec 2023 for which she received TNK. Her work up was negative for acute infarct. Loop recorder was  implanted. Her examination today is unremarkable. However, she is on eliquis and aspirin as she was instructed by a provider to resume eliquis. She has a history of DVT/PE 15 yrs ago for which she was taking eliquis in the past. However she had stopped taking it few days before the episode.     I discussed about discontinuation of eliquis and continuing aspirin 325mg. She has not had any evidence of medical conditions that warrant anticoagulation at this time. I have reached out to her cardiologist Dr Bragg about anticoagulation and waiting to hear back. I discussed with her about stroke symptoms and seeking immediate care if she has any new symptoms. She is allergic to statins so she takes zetia.     Etiology - TNK aborted stroke. ESUS?    Plan:   - discontinue eliquis for now  - start aspirin 325mg   - continue zetia  - f/u cardiology regarding loop recorder  - will update regarding anticoagulation once I hear back from her cardiologist.   - f/u as needed    Ti Banks MD  Vascular and Interventional Neurology    45 minutes were personally spent on this visit including my review of available records from referring physician, prior imaging, comprehensive physical and neurologic examination and discussion with the patient including plan of care and counseling .

## 2024-04-26 DIAGNOSIS — D68.59 HYPERCOAGULABLE STATE: Primary | ICD-10-CM

## 2024-05-14 NOTE — PROGRESS NOTES
HEMATOLOGY/ONCOLOGY OFFICE CLINIC VISIT    Visit Information:    Initial Evaluation: 5/15/2024  Referring Provider:   Dr. Tucker Bragg  Other providers:Dr Diop  Code status:  Not addressed    Diagnosis:  Pulmonary embolism  History of recurrent CVA    Present treatment:  ASA    Treatment/Oncology history:   Eliquis      CLINICAL HISTORY:       Patient: Hilda Ordonez is a 73 y.o. female with history of lower extremity DVT and pulmonary embolism in 2017 for which she was on Eliquis;  stroke  in 2015 with no neurological deficit  kindly referred for hypercoagulable state workup.   Patient presented to [facility] ED 12/23/2023 with left upper extremity ataxia, and left sided vision changes, headache, and neck pain. TNK was administered and  was admitted to ICU for post fibronolytic monitoring.  She did well and was discharged home.     Patient is  here today by herself.  She feels good and voices no concerns except from occasional bruising secondary to aspirin.  She denies any fever, chills, sweats.  No chest pain  she does report dyspnea on exertion.  She denies any family history of thromboembolic events.        Chief Complaint: New Patient (NP referred by Dr. Tucker Bragg for Hypercoag w/up.)      Interval History:        Past Medical History:   Diagnosis Date    History of CVA (cerebrovascular accident)     HLD (hyperlipidemia)     Pulmonary embolism       Past Surgical History:   Procedure Laterality Date    BLADDER SUSPENSION      CATARACT EXTRACTION      INSERTION OF IMPLANTABLE LOOP RECORDER N/A 1/23/2024    Procedure: Insertion, Implantable Loop Recorder;  Surgeon: Tucker Bragg MD;  Location: [facility] CATH LAB;  Service: Cardiology;  Laterality: N/A;  ILR IMPLANT (BS)    PARS PLANA VITRECTOMY W/ REPAIR OF MACULAR HOLE      PARTIAL HYSTERECTOMY      SPINE SURGERY      TYMPANOSTOMY TUBE PLACEMENT       Family History   Problem Relation Name Age of Onset    Vitamin D deficiency Father      Cancer Father             Review of patient's allergies indicates:   Allergen Reactions    Prednisone Swelling    Gabapentin     Statins-hmg-coa reductase inhibitors      Other reaction(s): Angioedema      Current Outpatient Medications on File Prior to Visit   Medication Sig Dispense Refill    ALPRAZolam (XANAX) 0.25 MG tablet Take 0.25 mg by mouth 2 (two) times daily as needed.      magnesium oxide 500 mg Cap Take 500 mg by mouth once daily.      multivit-min36/iron/folic acid (GERITOL COMPLETE ORAL) Take by mouth.      multivit-mins no.63/iron/folic (M-VIT ORAL) Take by mouth.      bismuth subsalicylate (BISMUTH) 262 mg Chew Take by mouth.      [DISCONTINUED] aspirin (ECOTRIN) 325 MG EC tablet Take 325 mg by mouth once daily.      [DISCONTINUED] aspirin 81 MG Chew Patient needs to take 325 mg of aspirin daily (our system is not letting me enter 325 mg daily; its making choose 81 mg tabs (4 tabs) to equal 324 mg daily - which is incorrect). 120 tablet 0    [DISCONTINUED] ELIQUIS 5 mg Tab Take 5 mg by mouth 2 (two) times daily.      [DISCONTINUED] ezetimibe (ZETIA) 10 mg tablet Take 10 mg by mouth once daily.       Current Facility-Administered Medications on File Prior to Visit   Medication Dose Route Frequency Provider Last Rate Last Admin    aspirin tablet 325 mg  325 mg Oral Daily Destiny Sheehan ANP        diazePAM tablet 10 mg  10 mg Oral On Call Procedure Tucker Bragg MD   10 mg at 01/23/24 0630      Review of Systems   Constitutional:  Negative for activity change, appetite change, chills, fatigue, fever and unexpected weight change.   HENT:  Negative for mouth dryness, mouth sores, nosebleeds, sore throat and trouble swallowing.    Eyes:  Negative for visual disturbance.   Respiratory:  Positive for shortness of breath. Negative for cough.    Cardiovascular:  Negative for chest pain, palpitations and leg swelling.   Gastrointestinal:  Positive for diarrhea. Negative for abdominal distention, abdominal pain, blood in stool,  "change in bowel habit, constipation, nausea and vomiting.   Endocrine: Negative.    Genitourinary:  Negative for dysuria, frequency, hematuria and urgency.   Musculoskeletal:  Positive for back pain. Negative for arthralgias, myalgias and neck pain.   Integumentary:  Negative for rash.   Neurological:  Negative for dizziness, tremors, syncope, speech difficulty, weakness, light-headedness, numbness, headaches and memory loss.   Hematological:  Negative for adenopathy. Does not bruise/bleed easily.   Psychiatric/Behavioral:  Negative for confusion and suicidal ideas. The patient is nervous/anxious.               Vitals:    05/15/24 1349   BP: (!) 143/71   BP Location: Right arm   Patient Position: Sitting   Pulse: (!) 59   Resp: 17   Temp: 98 °F (36.7 °C)   TempSrc: Oral   SpO2: 99%   Weight: 63.2 kg (139 lb 6.4 oz)   Height: 5' 7" (1.702 m)      Wt Readings from Last 6 Encounters:   05/15/24 63.2 kg (139 lb 6.4 oz)   02/08/24 65.8 kg (145 lb)   01/23/24 66 kg (145 lb 8.1 oz)   12/23/23 65.9 kg (145 lb 4.5 oz)   07/19/22 62.9 kg (138 lb 9.6 oz)   06/16/22 60.6 kg (133 lb 9.6 oz)     Body mass index is 21.83 kg/m².  Body surface area is 1.73 meters squared.  Physical Exam  Vitals and nursing note reviewed.   Constitutional:       General: She is not in acute distress.     Appearance: Normal appearance. She is well-developed.   HENT:      Head: Normocephalic and atraumatic.      Mouth/Throat:      Mouth: Mucous membranes are moist.   Eyes:      General: No scleral icterus.     Extraocular Movements: Extraocular movements intact.      Conjunctiva/sclera: Conjunctivae normal.      Pupils: Pupils are equal, round, and reactive to light.   Neck:      Vascular: No JVD.   Cardiovascular:      Rate and Rhythm: Normal rate and regular rhythm.      Heart sounds: No murmur heard.  Pulmonary:      Effort: Pulmonary effort is normal.      Breath sounds: Normal breath sounds. No wheezing or rhonchi.   Abdominal:      General: Bowel " sounds are normal. There is no distension.      Palpations: Abdomen is soft. There is no mass.      Tenderness: There is no abdominal tenderness.   Musculoskeletal:         General: No swelling or deformity.      Cervical back: Neck supple.   Lymphadenopathy:      Head:      Right side of head: No submandibular adenopathy.      Left side of head: No submandibular adenopathy.      Cervical: No cervical adenopathy.      Upper Body:      Right upper body: No supraclavicular or axillary adenopathy.      Left upper body: No supraclavicular or axillary adenopathy.      Lower Body: No right inguinal adenopathy. No left inguinal adenopathy.   Skin:     General: Skin is warm.      Coloration: Skin is not jaundiced.      Findings: No lesion or rash.      Nails: There is no clubbing.   Neurological:      General: No focal deficit present.      Mental Status: She is alert and oriented to person, place, and time.      Cranial Nerves: Cranial nerves 2-12 are intact.   Psychiatric:         Attention and Perception: Attention normal.         Behavior: Behavior is cooperative.         Judgment: Judgment normal.       ECOG SCORE             Laboratory:  CBC with Differential:  Lab Results   Component Value Date    WBC 6.29 12/23/2023    RBC 4.41 12/23/2023    HGB 13.1 12/23/2023    HCT 39.5 12/23/2023    MCV 89.6 12/23/2023    MCH 29.7 12/23/2023    MCHC 33.2 12/23/2023    RDW 13.0 12/23/2023     12/23/2023    MPV 10.6 (H) 12/23/2023        CMP:  Sodium   Date Value Ref Range Status   12/23/2023 143 136 - 145 mmol/L Final     Potassium   Date Value Ref Range Status   12/23/2023 3.9 3.5 - 5.1 mmol/L Final     CO2   Date Value Ref Range Status   12/23/2023 26 23 - 31 mmol/L Final     Blood Urea Nitrogen   Date Value Ref Range Status   12/23/2023 15.1 9.8 - 20.1 mg/dL Final     Creatinine   Date Value Ref Range Status   12/23/2023 0.97 0.55 - 1.02 mg/dL Final     Calcium   Date Value Ref Range Status   12/23/2023 9.5 8.4 - 10.2  mg/dL Final     Albumin   Date Value Ref Range Status   12/23/2023 4.3 3.4 - 4.8 g/dL Final     Bilirubin Total   Date Value Ref Range Status   12/23/2023 0.7 <=1.5 mg/dL Final     ALP   Date Value Ref Range Status   12/23/2023 114 40 - 150 unit/L Final     AST   Date Value Ref Range Status   12/23/2023 18 5 - 34 unit/L Final     ALT   Date Value Ref Range Status   12/23/2023 22 0 - 55 unit/L Final     Estimated GFR-Non    Date Value Ref Range Status   06/08/2022 >60 mls/min/1.73/m2 Final             Assessment:       1. Hypercoagulable state    2. History of pulmonary embolism    3. History of stroke    4. Acute embolism and thrombosis of other thoracic veins    5. 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 Blood clots in the veins or venous system can travel through the bloodstream and cause deep vein thrombosis (a blood clot in the veins of the pelvis, leg, arm, liver, intestines or kidneys) or a pulmonary embolus (blood clot in the lungs). Blood clots in the arteries can increase the risk for stroke, heart attack, severe leg pain, difficulty walking, or even the loss of a limb. Hypercoagulable states are usually genetic (inherited) or acquired conditions.  Also discussed that the frequency of multiple factors can act concurrently to increase the frequency of hypercoagulability. Mild to moderate hyperhomocysteinemia is an independent risk factor for stroke, myocardial infarction, peripheral arterial disease. There is now convincing evidence that a high level of coagulation factor VIII is an important risk factor for venous thromboembolism. A factor VIII plasma concentration above 1500 IU/l is associated with an almost 5-fold risk for a first episode of venous thrombosis. In thrombosis patients high factor VIII has been shown to persist over time and  "is not related to an acute phase reaction. High factor VIII is also an important risk factor for recurrence of venous thrombosis. Elevation of factor VIII is associated with higher risk of large vessel arterial occlusions including stroke  Classic acquired risk factors for venous thrombosis include trauma, immobilization, sedentary lifestyle, long traveling, pregnancy, surgery, malignancy, morbid obesity and infection. These are all factors that may cause tissue damage, stasis of the blood, or changes in blood composition.           Plan:          Patient with history of DVT, PE in the recurrent CVA.  I recommend lifelong anticoagulation but she is adamant that she will not go back to [facility] and she is willing to take the risk, She will "just stay on aspirin".   She is not opposed to do hypercoagulable state workup.     Hypercoagulable state workup.    RTC in 2 weeks to discuss results      The patient was seen, interviewed and examined. Pertinent lab and radiology studies were reviewed.   The patient was given ample opportunity to ask questions, and to the best of my abilities, all questions answered to satisfaction; patient demonstrated understanding of what we discussed and agreeable to the plan. Pt instructed to call should develop concerning signs/symptoms or have further questions.     I'd like to thank for referring and allowing me the opportunity to participate in the care of this patient and if any questions, please do not hesitate to call the office at [phone].       Jenny Preston MD  Hematology/Oncology          "

## 2024-05-15 ENCOUNTER — OFFICE VISIT (OUTPATIENT)
Dept: HEMATOLOGY/ONCOLOGY | Facility: CLINIC | Age: 74
End: 2024-05-15
Payer: MEDICARE

## 2024-05-15 VITALS
BODY MASS INDEX: 21.88 KG/M2 | DIASTOLIC BLOOD PRESSURE: 71 MMHG | RESPIRATION RATE: 17 BRPM | HEIGHT: 67 IN | WEIGHT: 139.38 LBS | HEART RATE: 59 BPM | OXYGEN SATURATION: 99 % | TEMPERATURE: 98 F | SYSTOLIC BLOOD PRESSURE: 143 MMHG

## 2024-05-15 DIAGNOSIS — Z86.73 HISTORY OF STROKE: ICD-10-CM

## 2024-05-15 DIAGNOSIS — D68.59 HYPERCOAGULABLE STATE: Primary | ICD-10-CM

## 2024-05-15 DIAGNOSIS — Z86.718 HISTORY OF DVT OF LOWER EXTREMITY: ICD-10-CM

## 2024-05-15 DIAGNOSIS — Z86.711 HISTORY OF PULMONARY EMBOLISM: ICD-10-CM

## 2024-05-15 LAB
(HCYS)2 SERPL-MCNC: 10 UMOL/L (ref 5.1–15.4)
FACT VIII ACT/NOR PPP: 214 % (ref 59–191)
TT IMM BOVINE THROMBIN PPP: 17 SECONDS (ref 0–22)

## 2024-05-15 PROCEDURE — 85240 CLOT FACTOR VIII AHG 1 STAGE: CPT | Performed by: INTERNAL MEDICINE

## 2024-05-15 PROCEDURE — 85613 RUSSELL VIPER VENOM DILUTED: CPT | Performed by: INTERNAL MEDICINE

## 2024-05-15 PROCEDURE — 36415 COLL VENOUS BLD VENIPUNCTURE: CPT | Performed by: INTERNAL MEDICINE

## 2024-05-15 PROCEDURE — 81240 F2 GENE: CPT | Performed by: INTERNAL MEDICINE

## 2024-05-15 PROCEDURE — 86147 CARDIOLIPIN ANTIBODY EA IG: CPT | Performed by: INTERNAL MEDICINE

## 2024-05-15 PROCEDURE — 81241 F5 GENE: CPT | Performed by: INTERNAL MEDICINE

## 2024-05-15 PROCEDURE — 86146 BETA-2 GLYCOPROTEIN ANTIBODY: CPT | Mod: 59 | Performed by: INTERNAL MEDICINE

## 2024-05-15 PROCEDURE — 99214 OFFICE O/P EST MOD 30 MIN: CPT | Mod: PBBFAC | Performed by: INTERNAL MEDICINE

## 2024-05-15 PROCEDURE — 85306 CLOT INHIBIT PROT S FREE: CPT | Performed by: INTERNAL MEDICINE

## 2024-05-15 PROCEDURE — 99203 OFFICE O/P NEW LOW 30 MIN: CPT | Mod: S$PBB,,, | Performed by: INTERNAL MEDICINE

## 2024-05-15 PROCEDURE — 83090 ASSAY OF HOMOCYSTEINE: CPT | Performed by: INTERNAL MEDICINE

## 2024-05-15 PROCEDURE — 85303 CLOT INHIBIT PROT C ACTIVITY: CPT | Performed by: INTERNAL MEDICINE

## 2024-05-15 PROCEDURE — 99999 PR PBB SHADOW E&M-EST. PATIENT-LVL IV: CPT | Mod: PBBFAC,,, | Performed by: INTERNAL MEDICINE

## 2024-05-15 PROCEDURE — 85670 THROMBIN TIME PLASMA: CPT | Performed by: INTERNAL MEDICINE

## 2024-05-15 RX ORDER — ASPIRIN 325 MG
325 TABLET, DELAYED RELEASE (ENTERIC COATED) ORAL DAILY
COMMUNITY
End: 2024-05-15

## 2024-05-16 LAB
CARDIOLIPIN IGG QUANT (OHS): 1 GPL U/ML
CARDIOLIPIN IGM QUANT (OHS): 1.1 MPL U/ML
PROT C ACT/NOR PPP CHRO: 121 % (ref 70–150)
PROT S ACT/NOR PPP: 150 % (ref 65–150)
PROT S FREE AG ACT/NOR PPP IA: 121 % (ref 65–160)

## 2024-05-17 LAB
B2 GLYCOPROT1 IGG SERPL IA-ACNC: <9.4 SGU
B2 GLYCOPROT1 IGM SERPL IA-ACNC: <9.4 SMU

## 2024-05-20 LAB
COAGULATION SPECIALIST REVIEW: NORMAL
F2 C.20210G>A GENO BLD/T: NEGATIVE
PROVIDER SIGNING NAME: NORMAL

## 2024-05-21 LAB
COAGULATION SPECIALIST REVIEW: NORMAL
F5 P.R506Q BLD/T QL: NEGATIVE
PROVIDER SIGNING NAME: NORMAL

## 2024-05-23 LAB
DRVV CONF RATIO (OHS): 1.05
DRVV NORM RATIO (OHS): 1 (ref 0–1.19)
DRVV SCR RATIO (OHS): 1.05
L.A. PATH INTERP (OHS): NORMAL
LA ST CALC (OHS): 2.3 SECONDS (ref 0–7.9)

## 2024-06-05 ENCOUNTER — OFFICE VISIT (OUTPATIENT)
Dept: HEMATOLOGY/ONCOLOGY | Facility: CLINIC | Age: 74
End: 2024-06-05
Payer: MEDICARE

## 2024-06-05 VITALS
BODY MASS INDEX: 21.96 KG/M2 | OXYGEN SATURATION: 98 % | TEMPERATURE: 98 F | RESPIRATION RATE: 20 BRPM | DIASTOLIC BLOOD PRESSURE: 72 MMHG | WEIGHT: 139.88 LBS | HEART RATE: 63 BPM | HEIGHT: 67 IN | SYSTOLIC BLOOD PRESSURE: 120 MMHG

## 2024-06-05 DIAGNOSIS — Z86.718 HISTORY OF DVT OF LOWER EXTREMITY: ICD-10-CM

## 2024-06-05 DIAGNOSIS — Z86.73 HISTORY OF STROKE: ICD-10-CM

## 2024-06-05 DIAGNOSIS — Z86.711 HISTORY OF PULMONARY EMBOLISM: Primary | ICD-10-CM

## 2024-06-05 PROCEDURE — 99213 OFFICE O/P EST LOW 20 MIN: CPT | Mod: PBBFAC | Performed by: INTERNAL MEDICINE

## 2024-06-05 PROCEDURE — 99999 PR PBB SHADOW E&M-EST. PATIENT-LVL III: CPT | Mod: PBBFAC,,, | Performed by: INTERNAL MEDICINE

## 2024-06-05 PROCEDURE — 99214 OFFICE O/P EST MOD 30 MIN: CPT | Mod: S$PBB,,, | Performed by: INTERNAL MEDICINE

## 2024-06-05 RX ORDER — ASPIRIN 81 MG/1
81 TABLET ORAL DAILY
COMMUNITY

## 2024-06-05 NOTE — PROGRESS NOTES
HEMATOLOGY/ONCOLOGY OFFICE CLINIC VISIT    Visit Information:    Initial Evaluation: 5/15/2024  Referring Provider:   Dr. Tucker Bragg  Other providers:Dr Diop  Code status:  Not addressed    Diagnosis:  Pulmonary embolism  History of recurrent CVA    Present treatment:  ASA    Treatment/Oncology history:   Eliquis      CLINICAL HISTORY:       Patient: Hilda Ordonez is a 73 y.o. female with history of lower extremity DVT and pulmonary embolism in 2017 for which she was on Eliquis;  stroke  in 2015 with no neurological deficit  kindly referred for hypercoagulable state workup.   Patient presented to [facility] ED 12/23/2023 with left upper extremity ataxia, and left sided vision changes, headache, and neck pain. TNK was administered and  was admitted to ICU for post fibronolytic monitoring.  She did well and was discharged home.     Patient is  here today by herself.  She feels good and voices no concerns except from occasional bruising secondary to aspirin.  She denies any fever, chills, sweats. No chest pain  she does report dyspnea on exertion.  She denies any family history of thromboembolic events.      Chief Complaint: 2 Week Follow Up      Interval History:  Patient here today for follow up to discuss lab results, accompanied by her daughter. She is doing well and has no complaints today. She is taking   ASA 81 mg daily.   Lab results and recommendations discussed with them in detail.      Past Medical History:   Diagnosis Date    History of CVA (cerebrovascular accident)     HLD (hyperlipidemia)     Pulmonary embolism       Past Surgical History:   Procedure Laterality Date    BLADDER SUSPENSION      CATARACT EXTRACTION      INSERTION OF IMPLANTABLE LOOP RECORDER N/A 1/23/2024    Procedure: Insertion, Implantable Loop Recorder;  Surgeon: Tucker Bragg MD;  Location: [facility] CATH LAB;  Service: Cardiology;  Laterality: N/A;  ILR IMPLANT (BS)    PARS PLANA VITRECTOMY W/ REPAIR OF MACULAR HOLE      PARTIAL  HYSTERECTOMY      SPINE SURGERY      TYMPANOSTOMY TUBE PLACEMENT       Family History   Problem Relation Name Age of Onset    Vitamin D deficiency Father      Cancer Father            Review of patient's allergies indicates:   Allergen Reactions    Prednisone Swelling    Gabapentin     Statins-hmg-coa reductase inhibitors      Other reaction(s): Angioedema      Current Outpatient Medications on File Prior to Visit   Medication Sig Dispense Refill    ALPRAZolam (XANAX) 0.25 MG tablet Take 0.25 mg by mouth 2 (two) times daily as needed.      aspirin (ECOTRIN) 81 MG EC tablet Take 81 mg by mouth once daily.      calcium carbonate/vitamin D3 (VITAMIN D-3 ORAL) Take 250 mcg by mouth. 2 TAB at night.      magnesium oxide 500 mg Cap Take 500 mg by mouth once daily.      multivit-mins no.63/iron/folic (M-VIT ORAL) Take by mouth.      bismuth subsalicylate (BISMUTH) 262 mg Chew Take by mouth.      [DISCONTINUED] multivit-min36/iron/folic acid (GERITOL COMPLETE ORAL) Take by mouth.       Current Facility-Administered Medications on File Prior to Visit   Medication Dose Route Frequency Provider Last Rate Last Admin    diazePAM tablet 10 mg  10 mg Oral On Call Procedure Tucker Bragg MD   10 mg at 01/23/24 0630    [DISCONTINUED] aspirin tablet 325 mg  325 mg Oral Daily Destiny Sheehan, FELIBERTO          Review of Systems   Constitutional:  Negative for activity change, appetite change, chills, fatigue, fever and unexpected weight change.   HENT:  Negative for mouth dryness, mouth sores, nosebleeds, sore throat and trouble swallowing.    Eyes:  Negative for visual disturbance.   Respiratory:  Positive for shortness of breath. Negative for cough.    Cardiovascular:  Negative for chest pain, palpitations and leg swelling.   Gastrointestinal:  Positive for diarrhea. Negative for abdominal distention, abdominal pain, blood in stool, change in bowel habit, constipation, nausea and vomiting.   Endocrine: Negative.    Genitourinary:   "Negative for dysuria, frequency, hematuria and urgency.   Musculoskeletal:  Positive for back pain. Negative for arthralgias, myalgias and neck pain.   Integumentary:  Negative for rash.   Neurological:  Negative for dizziness, tremors, syncope, speech difficulty, weakness, light-headedness, numbness, headaches and memory loss.   Hematological:  Negative for adenopathy. Does not bruise/bleed easily.   Psychiatric/Behavioral:  Negative for confusion and suicidal ideas. The patient is nervous/anxious.           Vitals:    06/05/24 1127   BP: 120/72   BP Location: Left arm   Patient Position: Sitting   Pulse: 63   Resp: 20   Temp: 98 °F (36.7 °C)   TempSrc: Oral   SpO2: 98%   Weight: 63.5 kg (139 lb 14.4 oz)   Height: 5' 7" (1.702 m)          Wt Readings from Last 6 Encounters:   06/05/24 63.5 kg (139 lb 14.4 oz)   05/15/24 63.2 kg (139 lb 6.4 oz)   02/08/24 65.8 kg (145 lb)   01/23/24 66 kg (145 lb 8.1 oz)   12/23/23 65.9 kg (145 lb 4.5 oz)   07/19/22 62.9 kg (138 lb 9.6 oz)     Body mass index is 21.91 kg/m².  Body surface area is 1.73 meters squared.  Physical Exam  Vitals and nursing note reviewed.   Constitutional:       General: She is not in acute distress.     Appearance: Normal appearance. She is well-developed.   HENT:      Head: Normocephalic and atraumatic.      Mouth/Throat:      Mouth: Mucous membranes are moist.   Eyes:      General: No scleral icterus.     Extraocular Movements: Extraocular movements intact.      Conjunctiva/sclera: Conjunctivae normal.      Pupils: Pupils are equal, round, and reactive to light.   Neck:      Vascular: No JVD.   Cardiovascular:      Rate and Rhythm: Normal rate and regular rhythm.      Heart sounds: No murmur heard.  Pulmonary:      Effort: Pulmonary effort is normal.      Breath sounds: Normal breath sounds. No wheezing or rhonchi.   Abdominal:      General: Bowel sounds are normal. There is no distension.      Palpations: Abdomen is soft. There is no mass.      " Tenderness: There is no abdominal tenderness.   Musculoskeletal:         General: No swelling or deformity.      Cervical back: Neck supple.   Lymphadenopathy:      Head:      Right side of head: No submandibular adenopathy.      Left side of head: No submandibular adenopathy.      Cervical: No cervical adenopathy.      Upper Body:      Right upper body: No supraclavicular or axillary adenopathy.      Left upper body: No supraclavicular or axillary adenopathy.      Lower Body: No right inguinal adenopathy. No left inguinal adenopathy.   Skin:     General: Skin is warm.      Coloration: Skin is not jaundiced.      Findings: No lesion or rash.      Nails: There is no clubbing.   Neurological:      General: No focal deficit present.      Mental Status: She is alert and oriented to person, place, and time.      Cranial Nerves: Cranial nerves 2-12 are intact.   Psychiatric:         Attention and Perception: Attention normal.         Behavior: Behavior is cooperative.         Cognition and Memory: Memory is impaired (mild).         Judgment: Judgment normal.       Laboratory:  CBC with Differential:  Lab Results   Component Value Date    WBC 6.29 12/23/2023    RBC 4.41 12/23/2023    HGB 13.1 12/23/2023    HCT 39.5 12/23/2023    MCV 89.6 12/23/2023    MCH 29.7 12/23/2023    MCHC 33.2 12/23/2023    RDW 13.0 12/23/2023     12/23/2023    MPV 10.6 (H) 12/23/2023        CMP:  Sodium   Date Value Ref Range Status   12/23/2023 143 136 - 145 mmol/L Final     Potassium   Date Value Ref Range Status   12/23/2023 3.9 3.5 - 5.1 mmol/L Final     Chloride   Date Value Ref Range Status   12/23/2023 107 98 - 107 mmol/L Final     CO2   Date Value Ref Range Status   12/23/2023 26 23 - 31 mmol/L Final     Blood Urea Nitrogen   Date Value Ref Range Status   12/23/2023 15.1 9.8 - 20.1 mg/dL Final     Creatinine   Date Value Ref Range Status   12/23/2023 0.97 0.55 - 1.02 mg/dL Final     Calcium   Date Value Ref Range Status   12/23/2023  9.5 8.4 - 10.2 mg/dL Final     Albumin   Date Value Ref Range Status   12/23/2023 4.3 3.4 - 4.8 g/dL Final     Bilirubin Total   Date Value Ref Range Status   12/23/2023 0.7 <=1.5 mg/dL Final     ALP   Date Value Ref Range Status   12/23/2023 114 40 - 150 unit/L Final     AST   Date Value Ref Range Status   12/23/2023 18 5 - 34 unit/L Final     ALT   Date Value Ref Range Status   12/23/2023 22 0 - 55 unit/L Final     Estimated GFR-Non    Date Value Ref Range Status   06/08/2022 >60 mls/min/1.73/m2 Final         Assessment:       1. History of pulmonary embolism    2. History of DVT of lower extremity    3. History of stroke      Discussed with the patient that hypercoagulable states have an increased risk for blood clots developing in the arteries and veins. Blood clots in the veins or venous system can travel through the bloodstream and cause deep vein thrombosis (a blood clot in the veins of the pelvis, leg, arm, liver, intestines or kidneys) or a pulmonary embolus (blood clot in the lungs). Blood clots in the arteries can increase the risk for stroke, heart attack, severe leg pain, difficulty walking, or even the loss of a limb. Hypercoagulable states are usually genetic (inherited) or acquired conditions.  Also discussed that the frequency of multiple factors can act concurrently to increase the frequency of hypercoagulability. Mild to moderate hyperhomocysteinemia is an independent risk factor for stroke, myocardial infarction, peripheral arterial disease. There is now convincing evidence that a high level of coagulation factor VIII is an important risk factor for venous thromboembolism. A factor VIII plasma concentration above 1500 IU/l is associated with an almost 5-fold risk for a first episode of venous thrombosis. In thrombosis patients high factor VIII has been shown to persist over time and is not related to an acute phase reaction. High factor VIII is also an important risk factor for  "recurrence of venous thrombosis. Elevation of factor VIII is associated with higher risk of large vessel arterial occlusions including stroke  Classic acquired risk factors for venous thrombosis include trauma, immobilization, sedentary lifestyle, long traveling, pregnancy, surgery, malignancy, morbid obesity and infection. These are all factors that may cause tissue damage, stasis of the blood, or changes in blood composition.       Plan:        Patient with history of DVT, PE in the recurrent CVA.  I recommend lifelong anticoagulation but she is adamant that she will not go back to Eliquis and she is willing to take the risk, She will "just stay on aspirin". She is not opposed to do hypercoagulable state workup.    She is now willing to resume Eliquis, she has not taken it in months, she will need to start with titration, instructed to contact cardiologist  for and appointment to discuss. Also instructed to discuss taking ASA and Eliquis together with him.  Instructed to take  mg daily until she is seen by her cardiologist  RTC in 6 months with MD - may be canceled if she is able to get medication with cardiologist, instructed to call and let us know    The patient was seen, interviewed and examined. Pertinent lab and radiology studies were reviewed.   The patient was given ample opportunity to ask questions, and to the best of my abilities, all questions answered to satisfaction; patient demonstrated understanding of what we discussed and agreeable to the plan. Pt instructed to call should develop concerning signs/symptoms or have further questions.     Jenny Preston MD  Hematology/Oncology      Professional Services   I, Ani Kumar LPN, acted solely as a scribe for and in the presence of Dr. Jenny Preston, who performed these services.         Total time spent in counseling and discussion about further management options including relevant lab work, treatment,  prognosis, medications and " intended side effects was more than 45 minutes. More than 50% of the time was spent on counseling and coordination of care.  I spent a total of 55 minutes on the day of the visit.This includes face to face time and non-face to face time preparing to see the patient (eg, review of tests), Obtaining and/or reviewing separately obtained history, Documenting clinical information in the electronic or other health record, Independently interpreting resultsand communicating results to the patient/family/caregiver, or Care coordination.

## 2025-01-24 NOTE — PROGRESS NOTES
HEMATOLOGY/ONCOLOGY OFFICE CLINIC VISIT    Visit Information:    Initial Evaluation: 5/15/2024  Referring Provider:   Dr. Tucker Bragg  Other providers:Dr Diop  Code status:  Not addressed    Diagnosis:  Pulmonary embolism  History of recurrent CVA    Present treatment:  ASA    Treatment/Oncology history:   Eliquis      CLINICAL HISTORY:       Patient: Hilda Ordonez is a 74 y.o. female with history of lower extremity DVT and pulmonary embolism in 2017 for which she was on Eliquis;  stroke  in 2015 with no neurological deficit  kindly referred for hypercoagulable state workup.   Patient presented to [facility] ED 12/23/2023 with left upper extremity ataxia, and left sided vision changes, headache, and neck pain. TNK was administered and  was admitted to ICU for post fibronolytic monitoring.  She did well and was discharged home.     Patient is  here today by herself.  She feels good and voices no concerns except from occasional bruising secondary to aspirin.  She denies any fever, chills, sweats. No chest pain  she does report dyspnea on exertion.  She denies any family history of thromboembolic events.      Chief Complaint: 6 Month Follow Up      Interval History:      01/29/25: Patient here today for 6 month follow up. She is doing well and has no complaints today. Patient has received Eliquis (Dr Bragg) and taking as prescribed. Patient denies blood in urine and stool.     Past Medical History:   Diagnosis Date    History of CVA (cerebrovascular accident)     HLD (hyperlipidemia)     Pulmonary embolism       Past Surgical History:   Procedure Laterality Date    BLADDER SUSPENSION      CATARACT EXTRACTION      INSERTION OF IMPLANTABLE LOOP RECORDER N/A 1/23/2024    Procedure: Insertion, Implantable Loop Recorder;  Surgeon: Tucker Bragg MD;  Location: [facility] CATH LAB;  Service: Cardiology;  Laterality: N/A;  ILR IMPLANT (BS)    PARS PLANA VITRECTOMY W/ REPAIR OF MACULAR HOLE      PARTIAL HYSTERECTOMY      SPINE  SURGERY      TYMPANOSTOMY TUBE PLACEMENT       Family History   Problem Relation Name Age of Onset    Vitamin D deficiency Father      Cancer Father            Review of patient's allergies indicates:   Allergen Reactions    Prednisone Swelling    Gabapentin     Statins-hmg-coa reductase inhibitors      Other reaction(s): Angioedema    Other Reaction(s): Unknown      Current Outpatient Medications on File Prior to Visit   Medication Sig Dispense Refill    ALPRAZolam (XANAX) 0.25 MG tablet Take 0.25 mg by mouth 2 (two) times daily as needed.      apixaban (ELIQUIS) 5 mg Tab 1 tab(s) orally 2 times a day      ezetimibe (ZETIA) 10 mg tablet Take 10 mg by mouth.      [DISCONTINUED] aspirin (ECOTRIN) 81 MG EC tablet Take 81 mg by mouth once daily.      [DISCONTINUED] bismuth subsalicylate (BISMUTH) 262 mg Chew Take by mouth.      [DISCONTINUED] calcium carbonate/vitamin D3 (VITAMIN D-3 ORAL) Take 250 mcg by mouth. 2 TAB at night.      [DISCONTINUED] magnesium oxide 500 mg Cap Take 500 mg by mouth once daily.      [DISCONTINUED] multivit-mins no.63/iron/folic (M-VIT ORAL) Take by mouth.       Current Facility-Administered Medications on File Prior to Visit   Medication Dose Route Frequency Provider Last Rate Last Admin    diazePAM tablet 10 mg  10 mg Oral On Call Procedure Tucker Bragg MD   10 mg at 01/23/24 0630      Review of Systems   Constitutional:  Negative for activity change, appetite change, chills, fatigue, fever, night sweats and unexpected weight change.   HENT:  Negative for mouth dryness, mouth sores, nosebleeds, sore throat and trouble swallowing.    Eyes: Negative.    Respiratory:  Negative for cough and shortness of breath.    Cardiovascular:  Negative for chest pain, palpitations and leg swelling.   Gastrointestinal:  Negative for abdominal distention, abdominal pain, blood in stool, change in bowel habit, constipation, diarrhea, nausea and vomiting.   Endocrine: Negative.    Genitourinary:   "Negative for dysuria, frequency, hematuria and urgency.   Musculoskeletal:  Negative for arthralgias, back pain, myalgias and neck pain.   Integumentary:  Negative for rash.   Neurological:  Negative for dizziness, tremors, syncope, speech difficulty, weakness, light-headedness, numbness, headaches and memory loss.   Hematological:  Does not bruise/bleed easily.   Psychiatric/Behavioral:  Negative for confusion and suicidal ideas.           Vitals:    01/29/25 0941   BP: 131/73   BP Location: Left arm   Patient Position: Sitting   Pulse: (!) 58   Resp: 18   Temp: 97.7 °F (36.5 °C)   TempSrc: Oral   SpO2: 99%   Weight: 64.3 kg (141 lb 11.2 oz)   Height: 5' 7" (1.702 m)            Wt Readings from Last 6 Encounters:   01/29/25 64.3 kg (141 lb 11.2 oz)   06/05/24 63.5 kg (139 lb 14.4 oz)   05/15/24 63.2 kg (139 lb 6.4 oz)   02/08/24 65.8 kg (145 lb)   01/23/24 66 kg (145 lb 8.1 oz)   12/23/23 65.9 kg (145 lb 4.5 oz)     Body mass index is 22.19 kg/m².  Body surface area is 1.74 meters squared.  Physical Exam  Vitals and nursing note reviewed.   Constitutional:       General: She is not in acute distress.     Appearance: Normal appearance. She is well-developed.   HENT:      Head: Normocephalic and atraumatic.      Mouth/Throat:      Mouth: Mucous membranes are moist.   Eyes:      General: No scleral icterus.     Extraocular Movements: Extraocular movements intact.      Conjunctiva/sclera: Conjunctivae normal.      Pupils: Pupils are equal, round, and reactive to light.   Neck:      Vascular: No JVD.   Cardiovascular:      Rate and Rhythm: Normal rate and regular rhythm.      Heart sounds: No murmur heard.  Pulmonary:      Effort: Pulmonary effort is normal.      Breath sounds: Normal breath sounds. No wheezing or rhonchi.   Abdominal:      General: Bowel sounds are normal. There is no distension.      Palpations: Abdomen is soft. There is no mass.      Tenderness: There is no abdominal tenderness.   Musculoskeletal:    "      General: No swelling or deformity.      Cervical back: Neck supple.   Lymphadenopathy:      Head:      Right side of head: No submandibular adenopathy.      Left side of head: No submandibular adenopathy.      Cervical: No cervical adenopathy.      Upper Body:      Right upper body: No supraclavicular or axillary adenopathy.      Left upper body: No supraclavicular or axillary adenopathy.      Lower Body: No right inguinal adenopathy. No left inguinal adenopathy.   Skin:     General: Skin is warm.      Coloration: Skin is not jaundiced.      Findings: No lesion or rash.      Nails: There is no clubbing.   Neurological:      General: No focal deficit present.      Mental Status: She is alert and oriented to person, place, and time.      Cranial Nerves: Cranial nerves 2-12 are intact.   Psychiatric:         Attention and Perception: Attention normal.         Behavior: Behavior is cooperative.         Judgment: Judgment normal.       Laboratory:  CBC with Differential:  Lab Results   Component Value Date    WBC 6.29 12/23/2023    RBC 4.41 12/23/2023    HGB 13.1 12/23/2023    HCT 39.5 12/23/2023    MCV 89.6 12/23/2023    MCH 29.7 12/23/2023    MCHC 33.2 12/23/2023    RDW 13.0 12/23/2023     12/23/2023    MPV 10.6 (H) 12/23/2023        CMP:  Sodium   Date Value Ref Range Status   12/23/2023 143 136 - 145 mmol/L Final     Potassium   Date Value Ref Range Status   12/23/2023 3.9 3.5 - 5.1 mmol/L Final     Chloride   Date Value Ref Range Status   12/23/2023 107 98 - 107 mmol/L Final     CO2   Date Value Ref Range Status   12/23/2023 26 23 - 31 mmol/L Final     Blood Urea Nitrogen   Date Value Ref Range Status   12/23/2023 15.1 9.8 - 20.1 mg/dL Final     Creatinine   Date Value Ref Range Status   12/23/2023 0.97 0.55 - 1.02 mg/dL Final     Calcium   Date Value Ref Range Status   12/23/2023 9.5 8.4 - 10.2 mg/dL Final     Albumin   Date Value Ref Range Status   12/23/2023 4.3 3.4 - 4.8 g/dL Final     Bilirubin  Total   Date Value Ref Range Status   12/23/2023 0.7 <=1.5 mg/dL Final     ALP   Date Value Ref Range Status   12/23/2023 114 40 - 150 unit/L Final     AST   Date Value Ref Range Status   12/23/2023 18 5 - 34 unit/L Final     ALT   Date Value Ref Range Status   12/23/2023 22 0 - 55 unit/L Final     Estimated GFR-Non    Date Value Ref Range Status   06/08/2022 >60 mls/min/1.73/m2 Final         Assessment:       1. History of DVT of lower extremity    2. History of pulmonary embolism        Discussed with the patient that hypercoagulable states have an increased risk for blood clots developing in the arteries and veins. Blood clots in the veins or venous system can travel through the bloodstream and cause deep vein thrombosis (a blood clot in the veins of the pelvis, leg, arm, liver, intestines or kidneys) or a pulmonary embolus (blood clot in the lungs). Blood clots in the arteries can increase the risk for stroke, heart attack, severe leg pain, difficulty walking, or even the loss of a limb. Hypercoagulable states are usually genetic (inherited) or acquired conditions.  Also discussed that the frequency of multiple factors can act concurrently to increase the frequency of hypercoagulability. Mild to moderate hyperhomocysteinemia is an independent risk factor for stroke, myocardial infarction, peripheral arterial disease. There is now convincing evidence that a high level of coagulation factor VIII is an important risk factor for venous thromboembolism. A factor VIII plasma concentration above 1500 IU/l is associated with an almost 5-fold risk for a first episode of venous thrombosis. In thrombosis patients high factor VIII has been shown to persist over time and is not related to an acute phase reaction. High factor VIII is also an important risk factor for recurrence of venous thrombosis. Elevation of factor VIII is associated with higher risk of large vessel arterial occlusions including  stroke  Classic acquired risk factors for venous thrombosis include trauma, immobilization, sedentary lifestyle, long traveling, pregnancy, surgery, malignancy, morbid obesity and infection. These are all factors that may cause tissue damage, stasis of the blood, or changes in blood composition.       Plan:        Patient with history of DVT, PE in the recurrent CVA.  I recommend lifelong anticoagulation   Continue Eliquis 5mg as prescribed  Keep follow ups with PCP and cardiologist  RTC PRN    The patient was seen, interviewed and examined. Pertinent lab and radiology studies were reviewed.   The patient was given ample opportunity to ask questions, and to the best of my abilities, all questions answered to satisfaction; patient demonstrated understanding of what we discussed and agreeable to the plan. Pt instructed to call should develop concerning signs/symptoms or have further questions.   Visit today included increased complexity associated with the care of the episodic problem anticoagulation, addressing and managing the longitudinal care of the patient's recurrent blood clots.       Jenny Preston MD  Hematology/Oncology      Professional Services   I, Oneida Rendon LPN, acted solely as a scribe for and in the presence of Dr. Jenny Preston, who performed these services.

## 2025-01-29 ENCOUNTER — OFFICE VISIT (OUTPATIENT)
Dept: HEMATOLOGY/ONCOLOGY | Facility: CLINIC | Age: 75
End: 2025-01-29
Payer: MEDICARE

## 2025-01-29 VITALS
WEIGHT: 141.69 LBS | HEIGHT: 67 IN | OXYGEN SATURATION: 99 % | HEART RATE: 58 BPM | RESPIRATION RATE: 18 BRPM | BODY MASS INDEX: 22.24 KG/M2 | TEMPERATURE: 98 F | DIASTOLIC BLOOD PRESSURE: 73 MMHG | SYSTOLIC BLOOD PRESSURE: 131 MMHG

## 2025-01-29 DIAGNOSIS — Z86.718 HISTORY OF DVT OF LOWER EXTREMITY: Primary | ICD-10-CM

## 2025-01-29 DIAGNOSIS — Z86.711 HISTORY OF PULMONARY EMBOLISM: ICD-10-CM

## 2025-01-29 PROCEDURE — 99213 OFFICE O/P EST LOW 20 MIN: CPT | Mod: PBBFAC | Performed by: INTERNAL MEDICINE

## 2025-01-29 PROCEDURE — 99999 PR PBB SHADOW E&M-EST. PATIENT-LVL III: CPT | Mod: PBBFAC,,, | Performed by: INTERNAL MEDICINE

## 2025-01-29 PROCEDURE — 99213 OFFICE O/P EST LOW 20 MIN: CPT | Mod: S$PBB,,, | Performed by: INTERNAL MEDICINE

## 2025-01-29 PROCEDURE — G2211 COMPLEX E/M VISIT ADD ON: HCPCS | Mod: S$PBB,,, | Performed by: INTERNAL MEDICINE

## 2025-01-29 RX ORDER — EZETIMIBE 10 MG/1
10 TABLET ORAL
COMMUNITY

## 2025-07-08 ENCOUNTER — APPOINTMENT (OUTPATIENT)
Dept: LAB | Facility: HOSPITAL | Age: 75
End: 2025-07-08
Attending: INTERNAL MEDICINE
Payer: MEDICARE

## 2025-07-08 DIAGNOSIS — R26.81 UNSTEADY: ICD-10-CM

## 2025-07-08 DIAGNOSIS — R42 DIZZINESS AND GIDDINESS: Primary | ICD-10-CM

## 2025-07-08 LAB
BACTERIA #/AREA URNS AUTO: ABNORMAL /HPF
BILIRUB UR QL STRIP.AUTO: NEGATIVE
CLARITY UR: CLEAR
COLOR UR AUTO: ABNORMAL
GLUCOSE UR QL STRIP: NORMAL
HGB UR QL STRIP: NEGATIVE
KETONES UR QL STRIP: NEGATIVE
LEUKOCYTE ESTERASE UR QL STRIP: 75
MUCOUS THREADS URNS QL MICRO: ABNORMAL /LPF
NITRITE UR QL STRIP: NEGATIVE
PH UR STRIP: 5 [PH]
PROT UR QL STRIP: NEGATIVE
RBC #/AREA URNS AUTO: ABNORMAL /HPF
SP GR UR STRIP.AUTO: 1.02 (ref 1–1.03)
SQUAMOUS #/AREA URNS LPF: ABNORMAL /HPF
UROBILINOGEN UR STRIP-ACNC: NORMAL
WBC #/AREA URNS AUTO: ABNORMAL /HPF

## 2025-07-08 PROCEDURE — 81015 MICROSCOPIC EXAM OF URINE: CPT

## 2025-07-08 PROCEDURE — 87086 URINE CULTURE/COLONY COUNT: CPT | Mod: GA

## 2025-07-10 LAB
BACTERIA UR CULT: ABNORMAL

## (undated) DEVICE — ADHESIVE DERMABOND ADVANCED